# Patient Record
Sex: MALE | Race: WHITE | NOT HISPANIC OR LATINO | Employment: UNEMPLOYED | URBAN - METROPOLITAN AREA
[De-identification: names, ages, dates, MRNs, and addresses within clinical notes are randomized per-mention and may not be internally consistent; named-entity substitution may affect disease eponyms.]

---

## 2017-12-28 ENCOUNTER — HOSPITAL ENCOUNTER (EMERGENCY)
Facility: HOSPITAL | Age: 52
Discharge: HOME | End: 2017-12-28
Attending: EMERGENCY MEDICINE

## 2017-12-28 VITALS
TEMPERATURE: 99 F | RESPIRATION RATE: 18 BRPM | WEIGHT: 210 LBS | HEART RATE: 67 BPM | SYSTOLIC BLOOD PRESSURE: 124 MMHG | BODY MASS INDEX: 26.95 KG/M2 | OXYGEN SATURATION: 96 % | DIASTOLIC BLOOD PRESSURE: 63 MMHG | HEIGHT: 74 IN

## 2017-12-28 DIAGNOSIS — S16.1XXA STRAIN OF NECK MUSCLE, INITIAL ENCOUNTER: Primary | ICD-10-CM

## 2017-12-28 DIAGNOSIS — M54.9 ACUTE BILATERAL BACK PAIN, UNSPECIFIED BACK LOCATION: ICD-10-CM

## 2017-12-28 PROCEDURE — 63600175 PHARM REV CODE 636 W HCPCS: Performed by: NURSE PRACTITIONER

## 2017-12-28 PROCEDURE — 96372 THER/PROPH/DIAG INJ SC/IM: CPT

## 2017-12-28 PROCEDURE — 99283 EMERGENCY DEPT VISIT LOW MDM: CPT | Mod: 25

## 2017-12-28 RX ORDER — ORPHENADRINE CITRATE 30 MG/ML
60 INJECTION INTRAMUSCULAR; INTRAVENOUS
Status: COMPLETED | OUTPATIENT
Start: 2017-12-28 | End: 2017-12-28

## 2017-12-28 RX ORDER — METHOCARBAMOL 750 MG/1
1500 TABLET, FILM COATED ORAL 3 TIMES DAILY PRN
Qty: 30 TABLET | Refills: 0 | Status: SHIPPED | OUTPATIENT
Start: 2017-12-28 | End: 2018-01-02

## 2017-12-28 RX ORDER — KETOROLAC TROMETHAMINE 30 MG/ML
60 INJECTION, SOLUTION INTRAMUSCULAR; INTRAVENOUS
Status: COMPLETED | OUTPATIENT
Start: 2017-12-28 | End: 2017-12-28

## 2017-12-28 RX ADMIN — KETOROLAC TROMETHAMINE 60 MG: 30 INJECTION, SOLUTION INTRAMUSCULAR at 04:12

## 2017-12-28 RX ADMIN — ORPHENADRINE CITRATE 60 MG: 30 INJECTION INTRAMUSCULAR; INTRAVENOUS at 04:12
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
## 2019-10-09 ENCOUNTER — HOSPITAL ENCOUNTER (OUTPATIENT)
Facility: HOSPITAL | Age: 54
Discharge: HOME | End: 2019-10-12
Attending: EMERGENCY MEDICINE | Admitting: INTERNAL MEDICINE
Payer: MEDICAID

## 2019-10-09 DIAGNOSIS — M70.22 OLECRANON BURSITIS OF LEFT ELBOW: Primary | ICD-10-CM

## 2019-10-09 PROBLEM — I10 ESSENTIAL HYPERTENSION: Status: ACTIVE | Noted: 2019-10-09

## 2019-10-09 PROBLEM — F15.10 METHAMPHETAMINE USE: Status: ACTIVE | Noted: 2019-10-09

## 2019-10-09 PROBLEM — F33.9 MAJOR DEPRESSIVE DISORDER, RECURRENT: Status: ACTIVE | Noted: 2019-10-09

## 2019-10-09 PROBLEM — F17.200 TOBACCO DEPENDENCE: Status: ACTIVE | Noted: 2019-10-09

## 2019-10-09 PROBLEM — K21.9 GERD (GASTROESOPHAGEAL REFLUX DISEASE): Status: ACTIVE | Noted: 2019-10-09

## 2019-10-09 LAB
ALBUMIN SERPL BCP-MCNC: 4.2 G/DL (ref 3.5–5.2)
ALP SERPL-CCNC: 80 U/L (ref 55–135)
ALT SERPL W/O P-5'-P-CCNC: 15 U/L (ref 10–44)
ANION GAP SERPL CALC-SCNC: 10 MMOL/L (ref 8–16)
AST SERPL-CCNC: 16 U/L (ref 10–40)
BASOPHILS # BLD AUTO: 0.04 K/UL (ref 0–0.2)
BASOPHILS NFR BLD: 0.4 % (ref 0–1.9)
BILIRUB SERPL-MCNC: 0.4 MG/DL (ref 0.1–1)
BUN SERPL-MCNC: 12 MG/DL (ref 6–20)
CALCIUM SERPL-MCNC: 9.6 MG/DL (ref 8.7–10.5)
CHLORIDE SERPL-SCNC: 105 MMOL/L (ref 95–110)
CO2 SERPL-SCNC: 23 MMOL/L (ref 23–29)
CREAT SERPL-MCNC: 0.7 MG/DL (ref 0.5–1.4)
DIFFERENTIAL METHOD: ABNORMAL
EOSINOPHIL # BLD AUTO: 0.2 K/UL (ref 0–0.5)
EOSINOPHIL NFR BLD: 1.4 % (ref 0–8)
ERYTHROCYTE [DISTWIDTH] IN BLOOD BY AUTOMATED COUNT: 13.2 % (ref 11.5–14.5)
EST. GFR  (AFRICAN AMERICAN): >60 ML/MIN/1.73 M^2
EST. GFR  (NON AFRICAN AMERICAN): >60 ML/MIN/1.73 M^2
GLUCOSE SERPL-MCNC: 94 MG/DL (ref 70–110)
HCT VFR BLD AUTO: 44.6 % (ref 40–54)
HGB BLD-MCNC: 14.8 G/DL (ref 14–18)
IMM GRANULOCYTES # BLD AUTO: 0.03 K/UL (ref 0–0.04)
LACTATE SERPL-SCNC: 1 MMOL/L (ref 0.5–2.2)
LYMPHOCYTES # BLD AUTO: 2.2 K/UL (ref 1–4.8)
LYMPHOCYTES NFR BLD: 20 % (ref 18–48)
MCH RBC QN AUTO: 30.9 PG (ref 27–31)
MCHC RBC AUTO-ENTMCNC: 33.2 G/DL (ref 32–36)
MCV RBC AUTO: 93 FL (ref 82–98)
MONOCYTES # BLD AUTO: 0.8 K/UL (ref 0.3–1)
MONOCYTES NFR BLD: 7.4 % (ref 4–15)
NEUTROPHILS # BLD AUTO: 7.8 K/UL (ref 1.8–7.7)
NEUTROPHILS NFR BLD: 70.5 % (ref 38–73)
NRBC BLD-RTO: 0 /100 WBC
PLATELET # BLD AUTO: 279 K/UL (ref 150–350)
PMV BLD AUTO: 10.9 FL (ref 9.2–12.9)
POTASSIUM SERPL-SCNC: 4 MMOL/L (ref 3.5–5.1)
PROT SERPL-MCNC: 7.3 G/DL (ref 6–8.4)
RBC # BLD AUTO: 4.79 M/UL (ref 4.6–6.2)
SODIUM SERPL-SCNC: 138 MMOL/L (ref 136–145)
WBC # BLD AUTO: 11.02 K/UL (ref 3.9–12.7)

## 2019-10-09 PROCEDURE — 87040 BLOOD CULTURE FOR BACTERIA: CPT

## 2019-10-09 PROCEDURE — 36415 COLL VENOUS BLD VENIPUNCTURE: CPT

## 2019-10-09 PROCEDURE — 25000003 PHARM REV CODE 250: Performed by: NURSE PRACTITIONER

## 2019-10-09 PROCEDURE — 99285 EMERGENCY DEPT VISIT HI MDM: CPT | Mod: 25

## 2019-10-09 PROCEDURE — 96374 THER/PROPH/DIAG INJ IV PUSH: CPT

## 2019-10-09 PROCEDURE — S0077 INJECTION, CLINDAMYCIN PHOSP: HCPCS | Performed by: EMERGENCY MEDICINE

## 2019-10-09 PROCEDURE — G0378 HOSPITAL OBSERVATION PER HR: HCPCS

## 2019-10-09 PROCEDURE — 80053 COMPREHEN METABOLIC PANEL: CPT

## 2019-10-09 PROCEDURE — 83605 ASSAY OF LACTIC ACID: CPT

## 2019-10-09 PROCEDURE — 85025 COMPLETE CBC W/AUTO DIFF WBC: CPT

## 2019-10-09 PROCEDURE — 25000003 PHARM REV CODE 250: Performed by: EMERGENCY MEDICINE

## 2019-10-09 PROCEDURE — S4991 NICOTINE PATCH NONLEGEND: HCPCS | Performed by: EMERGENCY MEDICINE

## 2019-10-09 RX ORDER — AMLODIPINE BESYLATE 2.5 MG/1
2.5 TABLET ORAL DAILY
Status: ON HOLD | COMMUNITY
End: 2019-10-12 | Stop reason: SDUPTHER

## 2019-10-09 RX ORDER — IBUPROFEN 200 MG
24 TABLET ORAL
Status: DISCONTINUED | OUTPATIENT
Start: 2019-10-09 | End: 2019-10-12 | Stop reason: HOSPADM

## 2019-10-09 RX ORDER — POTASSIUM CHLORIDE 20 MEQ/15ML
40 SOLUTION ORAL
Status: DISCONTINUED | OUTPATIENT
Start: 2019-10-09 | End: 2019-10-12 | Stop reason: HOSPADM

## 2019-10-09 RX ORDER — ACETAMINOPHEN 325 MG/1
650 TABLET ORAL EVERY 4 HOURS PRN
Status: DISCONTINUED | OUTPATIENT
Start: 2019-10-09 | End: 2019-10-12 | Stop reason: HOSPADM

## 2019-10-09 RX ORDER — PANTOPRAZOLE SODIUM 40 MG/1
40 TABLET, DELAYED RELEASE ORAL DAILY
Status: DISCONTINUED | OUTPATIENT
Start: 2019-10-10 | End: 2019-10-12 | Stop reason: HOSPADM

## 2019-10-09 RX ORDER — ONDANSETRON 2 MG/ML
4 INJECTION INTRAMUSCULAR; INTRAVENOUS EVERY 6 HOURS PRN
Status: DISCONTINUED | OUTPATIENT
Start: 2019-10-09 | End: 2019-10-12 | Stop reason: HOSPADM

## 2019-10-09 RX ORDER — CITALOPRAM 40 MG/1
40 TABLET, FILM COATED ORAL DAILY
Status: ON HOLD | COMMUNITY
End: 2019-10-12 | Stop reason: SDUPTHER

## 2019-10-09 RX ORDER — GABAPENTIN 800 MG/1
800 TABLET ORAL DAILY
Status: ON HOLD | COMMUNITY
End: 2019-10-12 | Stop reason: SDUPTHER

## 2019-10-09 RX ORDER — LISINOPRIL 40 MG/1
40 TABLET ORAL DAILY
Status: DISCONTINUED | OUTPATIENT
Start: 2019-10-10 | End: 2019-10-12 | Stop reason: HOSPADM

## 2019-10-09 RX ORDER — GLUCAGON 1 MG
1 KIT INJECTION
Status: DISCONTINUED | OUTPATIENT
Start: 2019-10-09 | End: 2019-10-12 | Stop reason: HOSPADM

## 2019-10-09 RX ORDER — LANOLIN ALCOHOL/MO/W.PET/CERES
800 CREAM (GRAM) TOPICAL
Status: DISCONTINUED | OUTPATIENT
Start: 2019-10-09 | End: 2019-10-12 | Stop reason: HOSPADM

## 2019-10-09 RX ORDER — IBUPROFEN 200 MG
16 TABLET ORAL
Status: DISCONTINUED | OUTPATIENT
Start: 2019-10-09 | End: 2019-10-12 | Stop reason: HOSPADM

## 2019-10-09 RX ORDER — CLINDAMYCIN PHOSPHATE 900 MG/50ML
900 INJECTION, SOLUTION INTRAVENOUS
Status: COMPLETED | OUTPATIENT
Start: 2019-10-09 | End: 2019-10-09

## 2019-10-09 RX ORDER — SODIUM CHLORIDE 0.9 % (FLUSH) 0.9 %
10 SYRINGE (ML) INJECTION
Status: DISCONTINUED | OUTPATIENT
Start: 2019-10-09 | End: 2019-10-12 | Stop reason: HOSPADM

## 2019-10-09 RX ORDER — LANSOPRAZOLE 30 MG/1
30 CAPSULE, DELAYED RELEASE ORAL DAILY
COMMUNITY
End: 2019-10-09 | Stop reason: CLARIF

## 2019-10-09 RX ORDER — CITALOPRAM 40 MG/1
40 TABLET, FILM COATED ORAL DAILY
Status: DISCONTINUED | OUTPATIENT
Start: 2019-10-10 | End: 2019-10-12 | Stop reason: HOSPADM

## 2019-10-09 RX ORDER — RAMELTEON 8 MG/1
8 TABLET ORAL NIGHTLY PRN
Status: DISCONTINUED | OUTPATIENT
Start: 2019-10-09 | End: 2019-10-12 | Stop reason: HOSPADM

## 2019-10-09 RX ORDER — IBUPROFEN 200 MG
1 TABLET ORAL
Status: COMPLETED | OUTPATIENT
Start: 2019-10-09 | End: 2019-10-10

## 2019-10-09 RX ORDER — OMEPRAZOLE 20 MG/1
20 CAPSULE, DELAYED RELEASE ORAL DAILY
Status: ON HOLD | COMMUNITY
End: 2019-10-12 | Stop reason: SDUPTHER

## 2019-10-09 RX ORDER — ZOLPIDEM TARTRATE 5 MG/1
10 TABLET ORAL NIGHTLY PRN
COMMUNITY
End: 2019-10-09 | Stop reason: CLARIF

## 2019-10-09 RX ORDER — SODIUM CHLORIDE 9 MG/ML
INJECTION, SOLUTION INTRAVENOUS CONTINUOUS
Status: DISCONTINUED | OUTPATIENT
Start: 2019-10-10 | End: 2019-10-12 | Stop reason: HOSPADM

## 2019-10-09 RX ORDER — AMLODIPINE BESYLATE 2.5 MG/1
2.5 TABLET ORAL DAILY
Status: DISCONTINUED | OUTPATIENT
Start: 2019-10-10 | End: 2019-10-12 | Stop reason: HOSPADM

## 2019-10-09 RX ORDER — GABAPENTIN 400 MG/1
800 CAPSULE ORAL 3 TIMES DAILY
Status: DISCONTINUED | OUTPATIENT
Start: 2019-10-09 | End: 2019-10-12 | Stop reason: HOSPADM

## 2019-10-09 RX ORDER — POTASSIUM CHLORIDE 20 MEQ/15ML
60 SOLUTION ORAL
Status: DISCONTINUED | OUTPATIENT
Start: 2019-10-09 | End: 2019-10-12 | Stop reason: HOSPADM

## 2019-10-09 RX ORDER — CLINDAMYCIN PHOSPHATE 900 MG/50ML
900 INJECTION, SOLUTION INTRAVENOUS
Status: DISCONTINUED | OUTPATIENT
Start: 2019-10-10 | End: 2019-10-12 | Stop reason: HOSPADM

## 2019-10-09 RX ADMIN — NICOTINE 1 PATCH: 21 PATCH TRANSDERMAL at 07:10

## 2019-10-09 RX ADMIN — GABAPENTIN 800 MG: 400 CAPSULE ORAL at 10:10

## 2019-10-09 RX ADMIN — CLINDAMYCIN IN 5 PERCENT DEXTROSE 900 MG: 18 INJECTION, SOLUTION INTRAVENOUS at 07:10

## 2019-10-09 RX ADMIN — SODIUM CHLORIDE: 0.9 INJECTION, SOLUTION INTRAVENOUS at 10:10

## 2019-10-10 ENCOUNTER — ANESTHESIA EVENT (OUTPATIENT)
Dept: SURGERY | Facility: HOSPITAL | Age: 54
End: 2019-10-10
Payer: MEDICAID

## 2019-10-10 ENCOUNTER — ANESTHESIA (OUTPATIENT)
Dept: SURGERY | Facility: HOSPITAL | Age: 54
End: 2019-10-10
Payer: MEDICAID

## 2019-10-10 LAB
ANION GAP SERPL CALC-SCNC: 9 MMOL/L (ref 8–16)
BASOPHILS # BLD AUTO: 0.05 K/UL (ref 0–0.2)
BASOPHILS NFR BLD: 0.5 % (ref 0–1.9)
BUN SERPL-MCNC: 13 MG/DL (ref 6–20)
CALCIUM SERPL-MCNC: 9 MG/DL (ref 8.7–10.5)
CHLORIDE SERPL-SCNC: 103 MMOL/L (ref 95–110)
CO2 SERPL-SCNC: 22 MMOL/L (ref 23–29)
CREAT SERPL-MCNC: 0.7 MG/DL (ref 0.5–1.4)
CRP SERPL-MCNC: 17.1 MG/L (ref 0–8.2)
DIFFERENTIAL METHOD: ABNORMAL
EOSINOPHIL # BLD AUTO: 0.2 K/UL (ref 0–0.5)
EOSINOPHIL NFR BLD: 1.7 % (ref 0–8)
ERYTHROCYTE [DISTWIDTH] IN BLOOD BY AUTOMATED COUNT: 13.2 % (ref 11.5–14.5)
EST. GFR  (AFRICAN AMERICAN): >60 ML/MIN/1.73 M^2
EST. GFR  (NON AFRICAN AMERICAN): >60 ML/MIN/1.73 M^2
GLUCOSE SERPL-MCNC: 103 MG/DL (ref 70–110)
HCT VFR BLD AUTO: 43.6 % (ref 40–54)
HGB BLD-MCNC: 14.2 G/DL (ref 14–18)
IMM GRANULOCYTES # BLD AUTO: 0.03 K/UL (ref 0–0.04)
LYMPHOCYTES # BLD AUTO: 2.3 K/UL (ref 1–4.8)
LYMPHOCYTES NFR BLD: 22.7 % (ref 18–48)
MAGNESIUM SERPL-MCNC: 1.9 MG/DL (ref 1.6–2.6)
MCH RBC QN AUTO: 30.7 PG (ref 27–31)
MCHC RBC AUTO-ENTMCNC: 32.6 G/DL (ref 32–36)
MCV RBC AUTO: 94 FL (ref 82–98)
MONOCYTES # BLD AUTO: 1.2 K/UL (ref 0.3–1)
MONOCYTES NFR BLD: 11.1 % (ref 4–15)
NEUTROPHILS # BLD AUTO: 6.6 K/UL (ref 1.8–7.7)
NEUTROPHILS NFR BLD: 63.7 % (ref 38–73)
NRBC BLD-RTO: 0 /100 WBC
PLATELET # BLD AUTO: 252 K/UL (ref 150–350)
PMV BLD AUTO: 11.3 FL (ref 9.2–12.9)
POTASSIUM SERPL-SCNC: 3.9 MMOL/L (ref 3.5–5.1)
RBC # BLD AUTO: 4.62 M/UL (ref 4.6–6.2)
SODIUM SERPL-SCNC: 134 MMOL/L (ref 136–145)
WBC # BLD AUTO: 10.33 K/UL (ref 3.9–12.7)

## 2019-10-10 PROCEDURE — 96375 TX/PRO/DX INJ NEW DRUG ADDON: CPT | Mod: 59

## 2019-10-10 PROCEDURE — 87077 CULTURE AEROBIC IDENTIFY: CPT

## 2019-10-10 PROCEDURE — 86140 C-REACTIVE PROTEIN: CPT

## 2019-10-10 PROCEDURE — G0378 HOSPITAL OBSERVATION PER HR: HCPCS

## 2019-10-10 PROCEDURE — 71000039 HC RECOVERY, EACH ADD'L HOUR: Performed by: ORTHOPAEDIC SURGERY

## 2019-10-10 PROCEDURE — 94760 N-INVAS EAR/PLS OXIMETRY 1: CPT | Mod: 59

## 2019-10-10 PROCEDURE — 36415 COLL VENOUS BLD VENIPUNCTURE: CPT

## 2019-10-10 PROCEDURE — 63600175 PHARM REV CODE 636 W HCPCS: Performed by: NURSE ANESTHETIST, CERTIFIED REGISTERED

## 2019-10-10 PROCEDURE — 36000705 HC OR TIME LEV I EA ADD 15 MIN: Performed by: ORTHOPAEDIC SURGERY

## 2019-10-10 PROCEDURE — 87075 CULTR BACTERIA EXCEPT BLOOD: CPT

## 2019-10-10 PROCEDURE — 87186 SC STD MICRODIL/AGAR DIL: CPT

## 2019-10-10 PROCEDURE — 63600175 PHARM REV CODE 636 W HCPCS: Performed by: NURSE PRACTITIONER

## 2019-10-10 PROCEDURE — 00400 ANES INTEGUMENTARY SYS NOS: CPT | Performed by: ORTHOPAEDIC SURGERY

## 2019-10-10 PROCEDURE — 87070 CULTURE OTHR SPECIMN AEROBIC: CPT

## 2019-10-10 PROCEDURE — 99900104 DSU ONLY-NO CHARGE-EA ADD'L HR (STAT): Performed by: ORTHOPAEDIC SURGERY

## 2019-10-10 PROCEDURE — 24105 PR REMOVAL OF ELBOW BURSA: ICD-10-PCS | Mod: LT,,, | Performed by: ORTHOPAEDIC SURGERY

## 2019-10-10 PROCEDURE — 87206 SMEAR FLUORESCENT/ACID STAI: CPT

## 2019-10-10 PROCEDURE — 99202 OFFICE O/P NEW SF 15 MIN: CPT | Mod: 57,,, | Performed by: ORTHOPAEDIC SURGERY

## 2019-10-10 PROCEDURE — D9220A PRA ANESTHESIA: ICD-10-PCS | Mod: ,,, | Performed by: ANESTHESIOLOGY

## 2019-10-10 PROCEDURE — 25000003 PHARM REV CODE 250: Performed by: ANESTHESIOLOGY

## 2019-10-10 PROCEDURE — 71000033 HC RECOVERY, INTIAL HOUR: Performed by: ORTHOPAEDIC SURGERY

## 2019-10-10 PROCEDURE — D9220A PRA ANESTHESIA: Mod: ,,, | Performed by: ANESTHESIOLOGY

## 2019-10-10 PROCEDURE — 24105 EXCISION OLECRANON BURSA: CPT | Mod: LT,,, | Performed by: ORTHOPAEDIC SURGERY

## 2019-10-10 PROCEDURE — 87116 MYCOBACTERIA CULTURE: CPT

## 2019-10-10 PROCEDURE — 85025 COMPLETE CBC W/AUTO DIFF WBC: CPT

## 2019-10-10 PROCEDURE — S0077 INJECTION, CLINDAMYCIN PHOSP: HCPCS | Performed by: NURSE PRACTITIONER

## 2019-10-10 PROCEDURE — 37000009 HC ANESTHESIA EA ADD 15 MINS: Mod: 59 | Performed by: ORTHOPAEDIC SURGERY

## 2019-10-10 PROCEDURE — 25000003 PHARM REV CODE 250: Performed by: NURSE PRACTITIONER

## 2019-10-10 PROCEDURE — 37000008 HC ANESTHESIA 1ST 15 MINUTES: Mod: 59 | Performed by: ORTHOPAEDIC SURGERY

## 2019-10-10 PROCEDURE — 25000003 PHARM REV CODE 250: Performed by: NURSE ANESTHETIST, CERTIFIED REGISTERED

## 2019-10-10 PROCEDURE — 87102 FUNGUS ISOLATION CULTURE: CPT

## 2019-10-10 PROCEDURE — 36000704 HC OR TIME LEV I 1ST 15 MIN: Performed by: ORTHOPAEDIC SURGERY

## 2019-10-10 PROCEDURE — 27200651 HC AIRWAY, LMA: Performed by: NURSE ANESTHETIST, CERTIFIED REGISTERED

## 2019-10-10 PROCEDURE — 99900103 DSU ONLY-NO CHARGE-INITIAL HR (STAT): Performed by: ORTHOPAEDIC SURGERY

## 2019-10-10 PROCEDURE — 99202 PR OFFICE/OUTPT VISIT, NEW, LEVL II, 15-29 MIN: ICD-10-PCS | Mod: 57,,, | Performed by: ORTHOPAEDIC SURGERY

## 2019-10-10 PROCEDURE — 96376 TX/PRO/DX INJ SAME DRUG ADON: CPT | Mod: 59

## 2019-10-10 PROCEDURE — 80048 BASIC METABOLIC PNL TOTAL CA: CPT

## 2019-10-10 PROCEDURE — 87015 SPECIMEN INFECT AGNT CONCNTJ: CPT

## 2019-10-10 PROCEDURE — 83735 ASSAY OF MAGNESIUM: CPT

## 2019-10-10 RX ORDER — HYDROCODONE BITARTRATE AND ACETAMINOPHEN 10; 325 MG/1; MG/1
1 TABLET ORAL EVERY 4 HOURS PRN
Status: DISCONTINUED | OUTPATIENT
Start: 2019-10-10 | End: 2019-10-12 | Stop reason: HOSPADM

## 2019-10-10 RX ORDER — ONDANSETRON HYDROCHLORIDE 2 MG/ML
INJECTION, SOLUTION INTRAMUSCULAR; INTRAVENOUS
Status: DISCONTINUED | OUTPATIENT
Start: 2019-10-10 | End: 2019-10-10

## 2019-10-10 RX ORDER — SODIUM CHLORIDE, SODIUM LACTATE, POTASSIUM CHLORIDE, CALCIUM CHLORIDE 600; 310; 30; 20 MG/100ML; MG/100ML; MG/100ML; MG/100ML
75 INJECTION, SOLUTION INTRAVENOUS CONTINUOUS
Status: DISCONTINUED | OUTPATIENT
Start: 2019-10-10 | End: 2019-10-12 | Stop reason: HOSPADM

## 2019-10-10 RX ORDER — MIDAZOLAM HYDROCHLORIDE 1 MG/ML
INJECTION, SOLUTION INTRAMUSCULAR; INTRAVENOUS
Status: DISCONTINUED | OUTPATIENT
Start: 2019-10-10 | End: 2019-10-10

## 2019-10-10 RX ORDER — SODIUM CHLORIDE, SODIUM LACTATE, POTASSIUM CHLORIDE, CALCIUM CHLORIDE 600; 310; 30; 20 MG/100ML; MG/100ML; MG/100ML; MG/100ML
INJECTION, SOLUTION INTRAVENOUS CONTINUOUS PRN
Status: DISCONTINUED | OUTPATIENT
Start: 2019-10-10 | End: 2019-10-10

## 2019-10-10 RX ORDER — HYDROMORPHONE HYDROCHLORIDE 2 MG/ML
0.2 INJECTION, SOLUTION INTRAMUSCULAR; INTRAVENOUS; SUBCUTANEOUS EVERY 5 MIN PRN
Status: DISCONTINUED | OUTPATIENT
Start: 2019-10-10 | End: 2019-10-10 | Stop reason: HOSPADM

## 2019-10-10 RX ORDER — HYDROCODONE BITARTRATE AND ACETAMINOPHEN 5; 325 MG/1; MG/1
1 TABLET ORAL EVERY 4 HOURS PRN
Status: DISCONTINUED | OUTPATIENT
Start: 2019-10-10 | End: 2019-10-12 | Stop reason: HOSPADM

## 2019-10-10 RX ORDER — IBUPROFEN 400 MG/1
800 TABLET ORAL 3 TIMES DAILY
Status: DISCONTINUED | OUTPATIENT
Start: 2019-10-10 | End: 2019-10-12 | Stop reason: HOSPADM

## 2019-10-10 RX ORDER — DIPHENHYDRAMINE HYDROCHLORIDE 50 MG/ML
25 INJECTION INTRAMUSCULAR; INTRAVENOUS EVERY 6 HOURS PRN
Status: DISCONTINUED | OUTPATIENT
Start: 2019-10-10 | End: 2019-10-10 | Stop reason: HOSPADM

## 2019-10-10 RX ORDER — ONDANSETRON 2 MG/ML
4 INJECTION INTRAMUSCULAR; INTRAVENOUS ONCE
Status: DISCONTINUED | OUTPATIENT
Start: 2019-10-10 | End: 2019-10-10 | Stop reason: HOSPADM

## 2019-10-10 RX ORDER — MEPERIDINE HYDROCHLORIDE 50 MG/ML
12.5 INJECTION INTRAMUSCULAR; INTRAVENOUS; SUBCUTANEOUS ONCE AS NEEDED
Status: DISCONTINUED | OUTPATIENT
Start: 2019-10-10 | End: 2019-10-10 | Stop reason: HOSPADM

## 2019-10-10 RX ORDER — PROPOFOL 10 MG/ML
VIAL (ML) INTRAVENOUS
Status: DISCONTINUED | OUTPATIENT
Start: 2019-10-10 | End: 2019-10-10

## 2019-10-10 RX ORDER — FENTANYL CITRATE 50 UG/ML
25 INJECTION, SOLUTION INTRAMUSCULAR; INTRAVENOUS EVERY 5 MIN PRN
Status: DISCONTINUED | OUTPATIENT
Start: 2019-10-10 | End: 2019-10-10 | Stop reason: HOSPADM

## 2019-10-10 RX ORDER — SODIUM CHLORIDE 0.9 % (FLUSH) 0.9 %
3 SYRINGE (ML) INJECTION
Status: DISCONTINUED | OUTPATIENT
Start: 2019-10-10 | End: 2019-10-10 | Stop reason: HOSPADM

## 2019-10-10 RX ORDER — DEXAMETHASONE SODIUM PHOSPHATE 4 MG/ML
INJECTION, SOLUTION INTRA-ARTICULAR; INTRALESIONAL; INTRAMUSCULAR; INTRAVENOUS; SOFT TISSUE
Status: DISCONTINUED | OUTPATIENT
Start: 2019-10-10 | End: 2019-10-10

## 2019-10-10 RX ORDER — EPHEDRINE SULFATE 50 MG/ML
INJECTION, SOLUTION INTRAVENOUS
Status: DISCONTINUED | OUTPATIENT
Start: 2019-10-10 | End: 2019-10-10

## 2019-10-10 RX ORDER — CLINDAMYCIN HYDROCHLORIDE 300 MG/1
300 CAPSULE ORAL EVERY 8 HOURS
Qty: 30 CAPSULE | Refills: 0 | Status: SHIPPED | OUTPATIENT
Start: 2019-10-10 | End: 2019-10-12 | Stop reason: HOSPADM

## 2019-10-10 RX ORDER — OXYCODONE HYDROCHLORIDE 5 MG/1
5 TABLET ORAL
Status: DISCONTINUED | OUTPATIENT
Start: 2019-10-10 | End: 2019-10-10 | Stop reason: HOSPADM

## 2019-10-10 RX ORDER — PHENYLEPHRINE HYDROCHLORIDE 10 MG/ML
INJECTION INTRAVENOUS
Status: DISCONTINUED | OUTPATIENT
Start: 2019-10-10 | End: 2019-10-10

## 2019-10-10 RX ORDER — FENTANYL CITRATE 50 UG/ML
INJECTION, SOLUTION INTRAMUSCULAR; INTRAVENOUS
Status: DISCONTINUED | OUTPATIENT
Start: 2019-10-10 | End: 2019-10-10

## 2019-10-10 RX ORDER — LIDOCAINE HCL/PF 100 MG/5ML
SYRINGE (ML) INTRAVENOUS
Status: DISCONTINUED | OUTPATIENT
Start: 2019-10-10 | End: 2019-10-10

## 2019-10-10 RX ORDER — KETOROLAC TROMETHAMINE 30 MG/ML
15 INJECTION, SOLUTION INTRAMUSCULAR; INTRAVENOUS EVERY 6 HOURS PRN
Status: DISCONTINUED | OUTPATIENT
Start: 2019-10-10 | End: 2019-10-12 | Stop reason: HOSPADM

## 2019-10-10 RX ADMIN — RAMELTEON 8 MG: 8 TABLET, FILM COATED ORAL at 03:10

## 2019-10-10 RX ADMIN — PHENYLEPHRINE HYDROCHLORIDE 100 MCG: 10 INJECTION INTRAVENOUS at 03:10

## 2019-10-10 RX ADMIN — FENTANYL CITRATE 50 MCG: 50 INJECTION, SOLUTION INTRAMUSCULAR; INTRAVENOUS at 03:10

## 2019-10-10 RX ADMIN — GABAPENTIN 800 MG: 400 CAPSULE ORAL at 08:10

## 2019-10-10 RX ADMIN — FENTANYL CITRATE 100 MCG: 50 INJECTION, SOLUTION INTRAMUSCULAR; INTRAVENOUS at 02:10

## 2019-10-10 RX ADMIN — KETOROLAC TROMETHAMINE 15 MG: 30 INJECTION, SOLUTION INTRAMUSCULAR at 05:10

## 2019-10-10 RX ADMIN — CLINDAMYCIN IN 5 PERCENT DEXTROSE 900 MG: 18 INJECTION, SOLUTION INTRAVENOUS at 04:10

## 2019-10-10 RX ADMIN — CLINDAMYCIN IN 5 PERCENT DEXTROSE 900 MG: 18 INJECTION, SOLUTION INTRAVENOUS at 08:10

## 2019-10-10 RX ADMIN — SODIUM CHLORIDE, SODIUM LACTATE, POTASSIUM CHLORIDE, AND CALCIUM CHLORIDE: .6; .31; .03; .02 INJECTION, SOLUTION INTRAVENOUS at 02:10

## 2019-10-10 RX ADMIN — DEXAMETHASONE SODIUM PHOSPHATE 4 MG: 4 INJECTION, SOLUTION INTRAMUSCULAR; INTRAVENOUS at 03:10

## 2019-10-10 RX ADMIN — LIDOCAINE HYDROCHLORIDE 75 MG: 20 INJECTION, SOLUTION INTRAVENOUS at 03:10

## 2019-10-10 RX ADMIN — EPHEDRINE SULFATE 20 MG: 50 INJECTION, SOLUTION INTRAMUSCULAR; INTRAVENOUS; SUBCUTANEOUS at 03:10

## 2019-10-10 RX ADMIN — CLINDAMYCIN IN 5 PERCENT DEXTROSE 900 MG: 18 INJECTION, SOLUTION INTRAVENOUS at 12:10

## 2019-10-10 RX ADMIN — PROPOFOL 150 MG: 10 INJECTION, EMULSION INTRAVENOUS at 03:10

## 2019-10-10 RX ADMIN — ACETAMINOPHEN 650 MG: 325 TABLET ORAL at 03:10

## 2019-10-10 RX ADMIN — ONDANSETRON 4 MG: 2 INJECTION, SOLUTION INTRAMUSCULAR; INTRAVENOUS at 03:10

## 2019-10-10 RX ADMIN — EPHEDRINE SULFATE 10 MG: 50 INJECTION, SOLUTION INTRAMUSCULAR; INTRAVENOUS; SUBCUTANEOUS at 03:10

## 2019-10-10 RX ADMIN — MIDAZOLAM 2 MG: 1 INJECTION INTRAMUSCULAR; INTRAVENOUS at 02:10

## 2019-10-10 RX ADMIN — OXYCODONE HYDROCHLORIDE 5 MG: 5 TABLET ORAL at 04:10

## 2019-10-10 NOTE — PLAN OF CARE
10/10/19 1114   Tobacco Cessation Intervention   Do you use any type of tobacco product? Yes   Are you interested in quitting use of tobacco products? Thinking about quitting   Pt is a [place] resident and is a current daily smoker and shows interest in quitting.  Pt was educated on smoking cessation and was given information on the [place] Quit Now program.

## 2019-10-10 NOTE — CONSULTS
Past Medical History:   Diagnosis Date    COPD (chronic obstructive pulmonary disease)     Depression     GERD (gastroesophageal reflux disease)     Hepatitis C     Hypertension        Past Surgical History:   Procedure Laterality Date    MULTIPLE TOOTH EXTRACTIONS      TONSILLECTOMY         Current Facility-Administered Medications   Medication    0.9%  NaCl infusion    acetaminophen tablet 650 mg    amLODIPine tablet 2.5 mg    citalopram tablet 40 mg    clindamycin 900 MG/50 ML D5W 900 mg/50 mL IVPB 900 mg    dextrose 50% injection 12.5 g    dextrose 50% injection 25 g    gabapentin capsule 800 mg    glucagon (human recombinant) injection 1 mg    glucose chewable tablet 16 g    glucose chewable tablet 24 g    ketorolac injection 15 mg    lisinopril tablet 40 mg    magnesium oxide tablet 800 mg    magnesium oxide tablet 800 mg    nicotine 21 mg/24 hr 1 patch    ondansetron injection 4 mg    pantoprazole EC tablet 40 mg    potassium chloride 10% oral solution 40 mEq    potassium chloride 10% oral solution 40 mEq    potassium chloride 10% oral solution 60 mEq    ramelteon tablet 8 mg    sodium chloride 0.9% flush 10 mL       Review of patient's allergies indicates:   Allergen Reactions    Pcn [penicillins] Hives       Family History   Problem Relation Age of Onset    Stroke Mother     Heart disease Mother     COPD Father     Heart disease Father     Mental illness Brother        Social History     Socioeconomic History    Marital status: Single     Spouse name: Not on file    Number of children: Not on file    Years of education: Not on file    Highest education level: Not on file   Occupational History    Not on file   Social Needs    Financial resource strain: Not on file    Food insecurity:     Worry: Not on file     Inability: Not on file    Transportation needs:     Medical: Not on file     Non-medical: Not on file   Tobacco Use    Smoking status: Current Every Day  Smoker     Packs/day: 1.00     Years: 30.00     Pack years: 30.00     Types: Cigarettes   Substance and Sexual Activity    Alcohol use: Yes     Comment: occasionally    Drug use: Yes     Frequency: 2.0 times per week     Types: Methamphetamines     Comment: Reports use twice weekly, smokes crystal meth    Sexual activity: Yes     Partners: Female   Lifestyle    Physical activity:     Days per week: Not on file     Minutes per session: Not on file    Stress: Not on file   Relationships    Social connections:     Talks on phone: Not on file     Gets together: Not on file     Attends Anabaptist service: Not on file     Active member of club or organization: Not on file     Attends meetings of clubs or organizations: Not on file     Relationship status: Not on file   Other Topics Concern    Not on file   Social History Narrative    Not on file       Chief Complaint:   Chief Complaint   Patient presents with    Abscess     x 1 week, left arm, right thigh, and left arm pit       Consulting Physician: Self, Aaareferral    History of present illness:    This is a 54 y.o. year old male who complains of left elbow pain and swelling for 2-3 weeks. Recently worse with more pain and limited ROM. Denies injury or bite that he is aware of, and denies IVDA.    Review of Systems:    Constitution: Denies chills, fever, and sweats.  HENT: Denies headaches or blurry vision.  Cardiovascular: Denies chest pain or irregular heart beat.  Respiratory: Denies cough or shortness of breath.  Gastrointestinal: Denies abdominal pain, nausea, or vomiting.  Musculoskeletal:  Denies muscle cramps.  Neurological: Denies dizziness or focal weakness.  Psychiatric/Behavioral: Normal mental status.  Hematologic/Lymphatic: Denies bleeding problem or easy bruising/bleeding.  Skin: Denies rash or suspicious lesions.    Examination:    Vital Signs:    Vitals:    10/10/19 0426 10/10/19 0737 10/10/19 1118 10/10/19 1355   BP: 105/60 100/60 109/65  113/78   Pulse: 65 (!) 56 (!) 51 63   Resp: 18 15 16 17   Temp: 97.5 °F (36.4 °C) 96.3 °F (35.7 °C) 96.9 °F (36.1 °C) 98.6 °F (37 °C)   TempSrc: Oral Oral Oral Oral   SpO2: 97% 98% 99% 95%   Weight:       Height:           Body mass index is 20.38 kg/m².    This a well-developed, well nourished patient in no acute distress.    Alert and oriented x 3 and cooperative to examination.       Physical Exam: Left Elbow Exam    Skin  Scars:   None  Rash:   None    Inspection  Erythema:  mild  Bruising:  None  Swelling:  Olecranon bursa  Masses:  2-3cm subcutaneous abcess with 1cm skin lesion  Lymphadenopathy: None    Range of Motion  Flexion:  100°  Extension:  20°  Supination:  80°  Pronation:  80°    Tenderness  Medial Epicondyle: None  Lateral Epicondyle: None  Olecranon:  yes    Stability  Valgus Stress:  Stable  Varus Stress:  Stable    Strength:  Normal  Sensation:  Intact    Vascular  Pulses:  Palpable  Capillary Refill: Normal    Normal WBC but elevated CRP.      Imaging: X-rays ordered and images interpreted today personally by me of left elbow show soft tissue swelling but otherwise normal.        Assessment: Left elbow abcess    Plan:  He has a painful abcess over the olecranon bursa. Discussed options and he would like this drained. We will debride the abcess and obtain cultures.     After discussing the diagnosis and reviewing treatment options, the patient elected to proceed with surgical intervention.    In preparation for surgery:    A pre-operative clearance request was placed with primary hospital physician.    Appropriate pre-operative labs were ordered.    Pertinent risk factors and comorbidities for surgery were identified and reviewed, including HTN.    Pre-operative antibiotics were ordered.    The risks, benefits, and alternatives to the procedure were explained to the patient/family including, but not limited to: incomplete pain relief, surgical failure, hardware failure, need for further  procedures, damage to nerves, arteries, blood vessels and other structures, infection, Deep Vein Thrombosis (DVT), Pulmonary Embolus (PE), Complex Regional Pain Syndrome as well as general anesthetic complications including seizure, stroke, heart attack and even death. The patient/family understood these risks and wished to proceed and signed the informed consent. All questions were answered. No guarantees were implied or stated.    We will obtain the necessary clearances and schedule the patient for surgery.            DISCLAIMER: This note may have been dictated using voice recognition software and may contain grammatical errors.     NOTE: Consult report sent to referring provider via [employer] EMR.

## 2019-10-10 NOTE — PLAN OF CARE
CM met with pt bedside to complete discharge assessment. Pt verified information on facesheet as correct. Pt denies POA/LW. Pt reports living at listed address with his brother, Ad (pt does not remember number). PCP is [facility]- does not remember which doctor. [facility] [address] Pt denies hh/hd/dme. Pt reports being independent with all ADLs. Pt does not have a car, but uses his bike for transportation. Pt states that a family member will bring pt home once dc. DC plan is home. CM following to assist.        10/10/19 1230   Discharge Assessment   Assessment Type Discharge Planning Assessment   Confirmed/corrected address and phone number on facesheet? Yes   Assessment information obtained from? Patient   Communicated expected length of stay with patient/caregiver yes   Prior to hospitilization cognitive status: Alert/Oriented   Prior to hospitalization functional status: Independent   Current cognitive status: Alert/Oriented   Current Functional Status: Independent   Lives With sibling(s)   Able to Return to Prior Arrangements yes   Is patient able to care for self after discharge? Yes   Patient's perception of discharge disposition home or selfcare   Patient currently being followed by outpatient case management? No   Patient currently receives any other outside agency services? No   Equipment Currently Used at Home none   Do you have any problems affording any of your prescribed medications? No   Is the patient taking medications as prescribed? yes   Does the patient have transportation home? Yes   Transportation Anticipated family or friend will provide   Does the patient receive services at the [facility]? No   Discharge Plan A Home   DME Needed Upon Discharge  none   Patient/Family in Agreement with Plan yes

## 2019-10-10 NOTE — NURSING
Received report from Zen Figueroa Patient appears asleep in bed. Chest rise and fall. Will continue to monitor.

## 2019-10-10 NOTE — TRANSFER OF CARE
"Anesthesia Transfer of Care Note    Patient: Mook Anaya    Procedure(s) Performed: Procedure(s) (LRB):  INCISION AND DRAINAGE, ABSCESS (Left)    Patient location: PACU    Anesthesia Type: general    Transport from OR: Transported from OR on 2-3 L/min O2 by NC with adequate spontaneous ventilation    Post pain: adequate analgesia    Post assessment: no apparent anesthetic complications and tolerated procedure well    Post vital signs: stable    Level of consciousness: awake, alert and oriented    Nausea/Vomiting: no nausea/vomiting    Complications: none    Transfer of care protocol was followed      Last vitals:   Visit Vitals  /78 (BP Location: Right arm, Patient Position: Lying)   Pulse 63   Temp 37 °C (98.6 °F) (Oral)   Resp 17   Ht 6' 2" (1.88 m)   Wt 72 kg (158 lb 11.7 oz)   SpO2 95%   BMI 20.38 kg/m²     "

## 2019-10-10 NOTE — ASSESSMENT & PLAN NOTE
Patient reports smoking crystal meth yesterday and a regular use of 1-2 times per week, denies IV drug use. Will monitor for s/s of withdrawal. IVF hydration. Continuous telemetry monitoring.

## 2019-10-10 NOTE — SUBJECTIVE & OBJECTIVE
Interval History:  No new issues overnight.  Patient with persistent left elbow pain and swelling which improves with pain medication.  He is NPO awaiting ortho evaluation.    Review of Systems   Constitutional: Positive for activity change and fatigue. Negative for appetite change, chills and diaphoresis.   HENT: Negative for congestion, hearing loss and sore throat.    Respiratory: Negative for cough, shortness of breath and wheezing.    Cardiovascular: Negative for chest pain and palpitations.   Gastrointestinal: Negative for abdominal pain and nausea.   Genitourinary: Negative for dysuria, flank pain and hematuria.   Musculoskeletal: Positive for joint swelling. Negative for arthralgias, back pain and myalgias (Left elbow).   Skin: Positive for color change.        Redness and swelling to left elbow   Neurological: Negative for dizziness, syncope and light-headedness.   Hematological: Negative for adenopathy.   Psychiatric/Behavioral: Negative for agitation and confusion. The patient is not nervous/anxious.      Objective:     Vital Signs (Most Recent):  Temp: 98.1 °F (36.7 °C) (10/10/19 1603)  Pulse: 62 (10/10/19 1655)  Resp: (!) 29 (10/10/19 1655)  BP: (!) 143/76 (10/10/19 1655)  SpO2: 99 % (10/10/19 1655) Vital Signs (24h Range):  Temp:  [96.3 °F (35.7 °C)-98.6 °F (37 °C)] 98.1 °F (36.7 °C)  Pulse:  [48-81] 62  Resp:  [12-29] 29  SpO2:  [95 %-100 %] 99 %  BP: (100-147)/(58-87) 143/76     Weight: 72 kg (158 lb 11.7 oz)  Body mass index is 20.38 kg/m².    Intake/Output Summary (Last 24 hours) at 10/10/2019 1659  Last data filed at 10/10/2019 1605  Gross per 24 hour   Intake 800 ml   Output 530 ml   Net 270 ml      Physical Exam   Constitutional: He is oriented to person, place, and time. He appears well-developed and well-nourished.   Thin male   HENT:   Head: Normocephalic and atraumatic.   Nose: Nose normal.   Mouth/Throat: Oropharynx is clear and moist.   Eyes: Pupils are equal, round, and reactive to light.  Conjunctivae and EOM are normal.   Neck: Normal range of motion. Neck supple. JVD present.   Cardiovascular: Normal rate, regular rhythm, normal heart sounds and intact distal pulses.   No murmur heard.  Pulmonary/Chest: Effort normal and breath sounds normal. He has no wheezes.   Abdominal: Soft. Bowel sounds are normal. There is no guarding.   Musculoskeletal: Normal range of motion. He exhibits edema and tenderness.   Left elbow erythema, swelling and tenderness with palpation pustular noted to elbow.   Neurological: He is alert and oriented to person, place, and time. Coordination normal.   Skin: Skin is warm and dry. There is erythema.   Psychiatric: He has a normal mood and affect. His behavior is normal.   Nursing note and vitals reviewed.      Significant Labs:   CBC:   Recent Labs   Lab 10/09/19  1915 10/10/19  0424   WBC 11.02 10.33   HGB 14.8 14.2   HCT 44.6 43.6    252     CMP:   Recent Labs   Lab 10/09/19  1915 10/10/19  0424    134*   K 4.0 3.9    103   CO2 23 22*   GLU 94 103   BUN 12 13   CREATININE 0.7 0.7   CALCIUM 9.6 9.0   PROT 7.3  --    ALBUMIN 4.2  --    BILITOT 0.4  --    ALKPHOS 80  --    AST 16  --    ALT 15  --    ANIONGAP 10 9   EGFRNONAA >60 >60       Significant Imaging: I have reviewed and interpreted all pertinent imaging results/findings within the past 24 hours.

## 2019-10-10 NOTE — BRIEF OP NOTE
[facility]  Brief Operative Note    SUMMARY     Surgery Date: 10/10/2019     Surgeon(s) and Role:     * Louis Donahue MD - Primary    Assisting Surgeon: None    Pre-op Diagnosis:  Abscess [L02.91]    Post-op Diagnosis:  Post-Op Diagnosis Codes:     * Abscess [L02.91]    Procedure(s) (LRB):  INCISION AND DRAINAGE, ABSCESS (Left)    Anesthesia: General    Description of Procedure: left elbow olecranon bursectomy    Description of the findings of the procedure: See Dictation     Estimated Blood Loss: 30 mL         Specimens:   Specimen (12h ago, onward)    None        Dictation #1  MRN:[id number]  [facility]:[id number]  [id number]

## 2019-10-10 NOTE — ASSESSMENT & PLAN NOTE
Chronic, controlled. Continue home medications. Will treat with PRN antihypertensives to keep SBP <180.     BP Readings from Last 3 Encounters:   10/09/19 119/70   12/28/17 124/63   03/02/14 (!) 139/94

## 2019-10-10 NOTE — ASSESSMENT & PLAN NOTE
Does not meet sepsis criteria at time of admission - Blood cultures pending, WBC 11, Lactic 1.0, afebrile. Orthopedic consult placed. IV Clindamycin. NPO at midnight. L elbow xray pending.

## 2019-10-10 NOTE — PLAN OF CARE
Left elbow I&D with ace wrap in place with no drain and arm sling. Admin pain med for relief. NAD noted. VSS. Okay to transfer to floor per Anesthesia

## 2019-10-10 NOTE — ASSESSMENT & PLAN NOTE
Does not meet sepsis criteria at time of admission - Blood cultures pending, WBC 11, Lactic 1.0, afebrile. Orthopedic consult placed. IV Clindamycin. NPO at midnight. L elbow xray reviewed with no abscess noted  Patient likely with cellulitis and abscess.  Discussed with Dr. Donahue and patient will go to the OR this afternoon.  Continue IV antibiotic

## 2019-10-10 NOTE — HPI
Mook Anaya is a 54-year-old male with a past medical history of depression, hypertension, arthritis, hepatitis-C, and GERD who presented to the emergency room tonight with reports of left elbow pain and swelling. Patient reports abscess to left elbow that appeared initially 2 weeks ago, and has increased in size and tenderness. Patient also reports abscess to the axilla region and thigh.  Patient reports pain to the elbow is 10 on a 0-10 pain scale, has not tried over-the-counter medications for pain relief, denies drainage, describes pain as stabbing, pressure, and burning.  Patient denies fever, but does endorse nausea and diarrhea for the past 3 days, no vomiting.  Patient reports history of staph infection, last treated at  approximately 2 years ago.  Patient reports significant tobacco dependency and appreciates the use of crystal meth, last consumed yesterday, patient states he smokes crystal meth approximately 1-2 times per week.  Patient denies intravenous drug use.  Patient appreciates weight loss, stating he does not have electricity at his home or access to groceries.  The patient placed in the hospital overnight for further workup and management of symptoms.  Patient not accompanied by any visitors during the initial interview.

## 2019-10-10 NOTE — NURSING
Returned from surgery via stretcher, alert, oriented, v/s wnl. Fall prevention ongoing left arm with ace wrap and sling in use, dressing dry and intact

## 2019-10-10 NOTE — SUBJECTIVE & OBJECTIVE
Past Medical History:   Diagnosis Date    COPD (chronic obstructive pulmonary disease)     Depression     GERD (gastroesophageal reflux disease)     Hepatitis C     Hypertension        Past Surgical History:   Procedure Laterality Date    MULTIPLE TOOTH EXTRACTIONS      TONSILLECTOMY         Review of patient's allergies indicates:   Allergen Reactions    Pcn [penicillins] Hives       No current facility-administered medications on file prior to encounter.      Current Outpatient Medications on File Prior to Encounter   Medication Sig    amLODIPine (NORVASC) 2.5 MG tablet Take 2.5 mg by mouth once daily.    gabapentin (NEURONTIN) 800 MG tablet Take 800 mg by mouth once daily.     lisinopril (PRINIVIL,ZESTRIL) 40 MG tablet Take 40 mg by mouth once daily.     omeprazole (PRILOSEC) 20 MG capsule Take 20 mg by mouth once daily.    citalopram (CELEXA) 40 MG tablet Take 40 mg by mouth once daily.    [DISCONTINUED] albuterol (PROVENTIL HFA) 90 mcg/actuation inhaler Inhale 2 puffs into the lungs every 6 (six) hours as needed.    [DISCONTINUED] aspirin 325 MG tablet Take 325 mg by mouth once daily.    [DISCONTINUED] diazepam (VALIUM) 5 MG tablet Take 1 tablet (5 mg total) by mouth every 8 (eight) hours as needed (muscle spasm).    [DISCONTINUED] lansoprazole (PREVACID) 30 MG capsule Take 30 mg by mouth once daily.    [DISCONTINUED] tramadol (ULTRAM) 50 mg tablet Take 1 tablet (50 mg total) by mouth every 6 (six) hours as needed for Pain.    [DISCONTINUED] zolpidem (AMBIEN) 5 MG Tab Take 10 mg by mouth nightly as needed.     Family History     Problem Relation (Age of Onset)    COPD Father    Heart disease Mother, Father    Mental illness Brother    Stroke Mother        Tobacco Use    Smoking status: Current Every Day Smoker     Packs/day: 1.00     Years: 30.00     Pack years: 30.00     Types: Cigarettes   Substance and Sexual Activity    Alcohol use: Yes     Comment: occasionally    Drug use: Yes      Frequency: 2.0 times per week     Types: Methamphetamines     Comment: Reports use twice weekly, smokes crystal meth    Sexual activity: Yes     Partners: Female     Review of Systems   Constitutional: Negative for activity change, appetite change, chills, fatigue and fever.   HENT: Negative for congestion, sinus pressure, sinus pain and sore throat.    Eyes: Negative for photophobia and visual disturbance.   Respiratory: Negative for cough, choking, chest tightness, shortness of breath and wheezing.    Cardiovascular: Negative for chest pain, palpitations and leg swelling.   Gastrointestinal: Positive for diarrhea and nausea. Negative for abdominal distention, abdominal pain, blood in stool, constipation and vomiting.   Genitourinary: Negative for difficulty urinating, dysuria, flank pain and hematuria.   Musculoskeletal: Positive for back pain and joint swelling. Negative for gait problem.        Chronic back pain  L elbow swelling, tenderness.     Skin: Positive for wound. Negative for rash.        Skin abscess to R thigh, L axilla, and L elbow.   Neurological: Negative for dizziness, syncope, weakness, light-headedness, numbness and headaches.   Psychiatric/Behavioral: Negative for confusion. The patient is not nervous/anxious.      Objective:     Vital Signs (Most Recent):  Temp: 98.2 °F (36.8 °C) (10/09/19 1845)  Pulse: 81 (10/09/19 1845)  Resp: 16 (10/09/19 1845)  BP: 123/77 (10/09/19 1845)  SpO2: 97 % (10/09/19 1845) Vital Signs (24h Range):  Temp:  [98.2 °F (36.8 °C)] 98.2 °F (36.8 °C)  Pulse:  [81] 81  Resp:  [16] 16  SpO2:  [97 %] 97 %  BP: (123)/(77) 123/77     Weight: 72.3 kg (159 lb 6.3 oz)  Body mass index is 20.46 kg/m².    Physical Exam   Constitutional: He is oriented to person, place, and time. No distress.   Unkempt   HENT:   Head: Normocephalic and atraumatic.   Poor dentition     Eyes: Pupils are equal, round, and reactive to light. EOM are normal. Right eye exhibits no discharge. Left eye  exhibits no discharge.   Neck: Normal range of motion. No JVD present.   Cardiovascular: Normal rate, regular rhythm, normal heart sounds and intact distal pulses.   No murmur heard.  Pulmonary/Chest: Effort normal and breath sounds normal. No respiratory distress. He has no wheezes. He has no rales. He exhibits no tenderness.   Abdominal: Soft. Bowel sounds are normal. He exhibits no distension. There is no tenderness. There is no rebound and no guarding.   Genitourinary:   Genitourinary Comments: Exam Deferred      Musculoskeletal: Normal range of motion. He exhibits edema and deformity. He exhibits no tenderness.   L elbow swelling   Neurological: He is alert and oriented to person, place, and time. No cranial nerve deficit or sensory deficit.   Skin: Skin is warm and dry. Capillary refill takes 2 to 3 seconds. No rash noted. He is not diaphoretic. There is erythema.   2 small abscess noted to L axilla, tender upon palpation, no drainage noted.  1 small abscess noted to lateral R anterior thigh - scabbed over - no drainage noted, mildly tender upon palpation.  Left elbow significantly swollen, warm to touch, with erythema and purple discoloration. Tender to touch. ROM intact.    Psychiatric: He has a normal mood and affect. His behavior is normal. Judgment and thought content normal.   Nursing note and vitals reviewed.        CRANIAL NERVES     CN III, IV, VI   Pupils are equal, round, and reactive to light.  Extraocular motions are normal.        Significant Labs:   BMP:   Recent Labs   Lab 10/09/19  1915   GLU 94      K 4.0      CO2 23   BUN 12   CREATININE 0.7   CALCIUM 9.6     CBC:   Recent Labs   Lab 10/09/19  1915   WBC 11.02   HGB 14.8   HCT 44.6        Lactic Acid:   Recent Labs   Lab 10/09/19  1938   LACTATE 1.0     Blood cultures pending     Significant Imaging: None noted - Elbow Xray pending

## 2019-10-10 NOTE — PROGRESS NOTES
[facility] Hospital Medicine  Progress Note    Patient Name: Mook Anaya  MRN: [id number]  Patient Class: OP- Observation   Admission Date: 10/9/2019  Length of Stay: 0 days  Attending Physician: Salvador Torres MD  Primary Care Provider: [facility]        Subjective:     Principal Problem:Olecranon bursitis of left elbow        HPI:  Mook Anaya is a 54-year-old male with a past medical history of depression, hypertension, arthritis, hepatitis-C, and GERD who presented to the emergency room tonight with reports of left elbow pain and swelling. Patient reports abscess to left elbow that appeared initially 2 weeks ago, and has increased in size and tenderness. Patient also reports abscess to the axilla region and thigh.  Patient reports pain to the elbow is 10 on a 0-10 pain scale, has not tried over-the-counter medications for pain relief, denies drainage, describes pain as stabbing, pressure, and burning.  Patient denies fever, but does endorse nausea and diarrhea for the past 3 days, no vomiting.  Patient reports history of staph infection, last treated at [facility] approximately 2 years ago.  Patient reports significant tobacco dependency and appreciates the use of crystal meth, last consumed yesterday, patient states he smokes crystal meth approximately 1-2 times per week.  Patient denies intravenous drug use.  Patient appreciates weight loss, stating he does not have electricity at his home or access to groceries.  The patient placed in the hospital overnight for further workup and management of symptoms.  Patient not accompanied by any visitors during the initial interview.      Overview/Hospital Course:  No notes on file    Interval History:  No new issues overnight.  Patient with persistent left elbow pain and swelling which improves with pain medication.  He is NPO awaiting ortho evaluation.    Review of Systems   Constitutional: Positive for activity  change and fatigue. Negative for appetite change, chills and diaphoresis.   HENT: Negative for congestion, hearing loss and sore throat.    Respiratory: Negative for cough, shortness of breath and wheezing.    Cardiovascular: Negative for chest pain and palpitations.   Gastrointestinal: Negative for abdominal pain and nausea.   Genitourinary: Negative for dysuria, flank pain and hematuria.   Musculoskeletal: Positive for joint swelling. Negative for arthralgias, back pain and myalgias (Left elbow).   Skin: Positive for color change.        Redness and swelling to left elbow   Neurological: Negative for dizziness, syncope and light-headedness.   Hematological: Negative for adenopathy.   Psychiatric/Behavioral: Negative for agitation and confusion. The patient is not nervous/anxious.      Objective:     Vital Signs (Most Recent):  Temp: 98.1 °F (36.7 °C) (10/10/19 1603)  Pulse: 62 (10/10/19 1655)  Resp: (!) 29 (10/10/19 1655)  BP: (!) 143/76 (10/10/19 1655)  SpO2: 99 % (10/10/19 1655) Vital Signs (24h Range):  Temp:  [96.3 °F (35.7 °C)-98.6 °F (37 °C)] 98.1 °F (36.7 °C)  Pulse:  [48-81] 62  Resp:  [12-29] 29  SpO2:  [95 %-100 %] 99 %  BP: (100-147)/(58-87) 143/76     Weight: 72 kg (158 lb 11.7 oz)  Body mass index is 20.38 kg/m².    Intake/Output Summary (Last 24 hours) at 10/10/2019 1659  Last data filed at 10/10/2019 1605  Gross per 24 hour   Intake 800 ml   Output 530 ml   Net 270 ml      Physical Exam   Constitutional: He is oriented to person, place, and time. He appears well-developed and well-nourished.   Thin male   HENT:   Head: Normocephalic and atraumatic.   Nose: Nose normal.   Mouth/Throat: Oropharynx is clear and moist.   Eyes: Pupils are equal, round, and reactive to light. Conjunctivae and EOM are normal.   Neck: Normal range of motion. Neck supple. JVD present.   Cardiovascular: Normal rate, regular rhythm, normal heart sounds and intact distal pulses.   No murmur heard.  Pulmonary/Chest: Effort  normal and breath sounds normal. He has no wheezes.   Abdominal: Soft. Bowel sounds are normal. There is no guarding.   Musculoskeletal: Normal range of motion. He exhibits edema and tenderness.   Left elbow erythema, swelling and tenderness with palpation pustular noted to elbow.   Neurological: He is alert and oriented to person, place, and time. Coordination normal.   Skin: Skin is warm and dry. There is erythema.   Psychiatric: He has a normal mood and affect. His behavior is normal.   Nursing note and vitals reviewed.      Significant Labs:   CBC:   Recent Labs   Lab 10/09/19  1915 10/10/19  0424   WBC 11.02 10.33   HGB 14.8 14.2   HCT 44.6 43.6    252     CMP:   Recent Labs   Lab 10/09/19  1915 10/10/19  0424    134*   K 4.0 3.9    103   CO2 23 22*   GLU 94 103   BUN 12 13   CREATININE 0.7 0.7   CALCIUM 9.6 9.0   PROT 7.3  --    ALBUMIN 4.2  --    BILITOT 0.4  --    ALKPHOS 80  --    AST 16  --    ALT 15  --    ANIONGAP 10 9   EGFRNONAA >60 >60       Significant Imaging: I have reviewed and interpreted all pertinent imaging results/findings within the past 24 hours.      Assessment/Plan:      * Olecranon bursitis of left elbow  Does not meet sepsis criteria at time of admission - Blood cultures pending, WBC 11, Lactic 1.0, afebrile. Orthopedic consult placed. IV Clindamycin. NPO at midnight. L elbow xray reviewed with no abscess noted  Patient likely with cellulitis and abscess.  Discussed with Dr. Donahue and patient will go to the OR this afternoon.  Continue IV antibiotic            Tobacco dependence  Assistance with smoking cessation was offered, including:  [x]  Medications  [x]  Counseling  []  Printed Information on Smoking Cessation  []  Referral to a Smoking Cessation Program    Patient was counseled regarding smoking for 3-10 minutes.          Methamphetamine use  Patient reports smoking crystal meth yesterday and a regular use of 1-2 times per week, denies IV drug use. Will  monitor for s/s of withdrawal. IVF hydration. Continuous telemetry monitoring.      Major depressive disorder, recurrent  Chronic, controlled. Will continue home medications.       GERD (gastroesophageal reflux disease)  Chronic, continue home meds.      Essential hypertension  Chronic, controlled. Continue home medications. Will treat with PRN antihypertensives to keep SBP <180.     BP Readings from Last 3 Encounters:   10/09/19 119/70   12/28/17 124/63   03/02/14 (!) 139/94             VTE Risk Mitigation (From admission, onward)         Ordered     IP VTE LOW RISK PATIENT  Once      10/09/19 2130     Place sequential compression device  Until discontinued      10/09/19 2130     Place ABRAM hose  Until discontinued      10/09/19 2130                      Princess Segal NP  Department of Hospital Medicine   [facility]

## 2019-10-10 NOTE — ANESTHESIA PREPROCEDURE EVALUATION
10/10/2019  Mook Anaya is a 54 y.o., male.    Anesthesia Evaluation    I have reviewed the Patient Summary Reports.    I have reviewed the Nursing Notes.   I have reviewed the Medications.     Review of Systems  Anesthesia Hx:  No problems with previous Anesthesia Hx of Anesthetic complications Patient reports smoking crystal meth yesterday and a regular use of 1-2 times per week, denies IV drug use. Will monitor for s/s of withdrawal. IVF hydration. Continuous telemetry monitoring.      Social:  Smoker Smoking Status: Current Every Day Smoker - 30 pack years  Smokeless Tobacco Status: Unknown  Alcohol use: Yes, unspecified volume  Drug use: Methamphetamines; Times per week: 2       Cardiovascular:   Hypertension    Pulmonary:   COPD    Hepatic/GI:   GERD, poorly controlled Liver Disease, Hepatitis, C    Psych:   Psychiatric History          Physical Exam  General:  Well nourished    Airway/Jaw/Neck:  Airway Findings: Mouth Opening: Normal Tongue: Normal  General Airway Assessment: Adult, Good  Mallampati: II  Improves to II with phonation.  TM Distance: 4-6 cm      Dental:  Dental Findings: In tact   Chest/Lungs:  Chest/Lungs Findings: Clear to auscultation, Normal Respiratory Rate     Heart/Vascular:  Heart Findings: Rate: Normal  Rhythm: Regular Rhythm  Sounds: Normal  Heart murmur: negative       Mental Status:  Mental Status Findings:  Cooperative, Alert and Oriented         Anesthesia Plan  Type of Anesthesia, risks & benefits discussed:  Anesthesia Type:  general  Patient's Preference:   Intra-op Monitoring Plan: standard ASA monitors  Intra-op Monitoring Plan Comments:   Post Op Pain Control Plan:   Post Op Pain Control Plan Comments:   Induction:    Beta Blocker:  Patient is not currently on a Beta-Blocker (No further documentation required).       Informed Consent: Patient understands risks  and agrees with Anesthesia plan.  Questions answered. Anesthesia consent signed with patient.  ASA Score: 3  emergent   Day of Surgery Review of History & Physical: I have interviewed and examined the patient. I have reviewed the patient's H&P dated:  There are no significant changes.          Ready For Surgery From Anesthesia Perspective.

## 2019-10-10 NOTE — ED NOTES
Patient identifiers for Mook Anaya checked and correct.  LOC: Patient is awake, alert, and aware of environment with an appropriate affect. Patient is oriented x 3 and speaking appropriately.  APPEARANCE: Patient resting comfortably and in no acute distress. Appears unkept   SKIN: The skin is warm and dry. Patient has normal skin turgor and moist mucus membrances. Skin is intact; no bruising or breakdown noted. Swollen red wounds to left axilla and right upper thigh x 1 week   MUSKULOSKELETAL: Patient is moving all extremities well, no obvious deformities noted. Pulses intact. C/o left elbow sweling and pain x1 week, ROM painful sensation pulses intact  RESPIRATORY: Airway is open and patent. Respirations are spontaneous and non-labored with normal effort and rate.  CARDIAC: Patient has a normal rate and rhythm. No peripheral edema noted. Capillary refill < 3 seconds.  ABDOMEN: No distention noted. Bowel sounds active in all 4 quadrants. Soft and non-tender upon palpation.  NEUROLOGICAL: PERRL. Facial expression is symmetrical. Hand grasps are equal bilaterally. Normal sensation in all extremities when touched with finger.  Allergies reported:   Review of patient's allergies indicates:   Allergen Reactions    Pcn [penicillins] Hives   Pt states he used cocaine 13 years ago and went to [facility], quit drugs until last year he started to smoke meth. States last meth use was last night. Has been calm and cooperative in ED.

## 2019-10-10 NOTE — ANESTHESIA POSTPROCEDURE EVALUATION
Anesthesia Post Evaluation    Patient: Mook Anaya    Procedure(s) Performed: Procedure(s) (LRB):  INCISION AND DRAINAGE, ABSCESS (Left)    Final Anesthesia Type: general  Patient location during evaluation: PACU  Patient participation: Yes- Able to Participate  Level of consciousness: awake and alert and oriented  Post-procedure vital signs: reviewed and stable  Pain management: adequate  Airway patency: patent  PONV status at discharge: No PONV  Anesthetic complications: no      Cardiovascular status: blood pressure returned to baseline  Respiratory status: unassisted, spontaneous ventilation and room air  Hydration status: euvolemic  Follow-up not needed.          Vitals Value Taken Time   /86 10/10/2019  4:35 PM   Temp 36.7 °C (98.1 °F) 10/10/2019  4:03 PM   Pulse 68 10/10/2019  4:35 PM   Resp 18 10/10/2019  4:35 PM   SpO2 99 % 10/10/2019  4:35 PM   Vitals shown include unvalidated device data.      No case tracking events are documented in the log.      Pain/Lynn Score: Pain Rating Prior to Med Admin: 3 (10/10/2019  5:32 AM)  Pain Rating Post Med Admin: 2 (10/10/2019  6:00 AM)         Never

## 2019-10-10 NOTE — H&P
[facility] Hospital Medicine  History & Physical    Patient Name: Mook Anaya  MRN: [id number]  Admission Date: 10/9/2019  Attending Physician: Clarence Smith MD   Primary Care Provider: Primary Doctor No         Patient information was obtained from patient and ER records.     Subjective:     Principal Problem:Olecranon bursitis of left elbow    Chief Complaint:   Chief Complaint   Patient presents with    Abscess     x 1 week, left arm, right thigh, and left arm pit        HPI: Mook Anaya is a 54-year-old male with a past medical history of depression, hypertension, arthritis, hepatitis-C, and GERD who presented to the emergency room tonight with reports of left elbow pain and swelling. Patient reports abscess to left elbow that appeared initially 2 weeks ago, and has increased in size and tenderness. Patient also reports abscess to the axilla region and thigh.  Patient reports pain to the elbow is 10 on a 0-10 pain scale, has not tried over-the-counter medications for pain relief, denies drainage, describes pain as stabbing, pressure, and burning.  Patient denies fever, but does endorse nausea and diarrhea for the past 3 days, no vomiting.  Patient reports history of staph infection, last treated at [facility] approximately 2 years ago.  Patient reports significant tobacco dependency and appreciates the use of crystal meth, last consumed yesterday, patient states he smokes crystal meth approximately 1-2 times per week.  Patient denies intravenous drug use.  Patient appreciates weight loss, stating he does not have electricity at his home or access to groceries.  The patient placed in the hospital overnight for further workup and management of symptoms.  Patient not accompanied by any visitors during the initial interview.      Past Medical History:   Diagnosis Date    COPD (chronic obstructive pulmonary disease)     Depression     GERD (gastroesophageal reflux  disease)     Hepatitis C     Hypertension        Past Surgical History:   Procedure Laterality Date    MULTIPLE TOOTH EXTRACTIONS      TONSILLECTOMY         Review of patient's allergies indicates:   Allergen Reactions    Pcn [penicillins] Hives       No current facility-administered medications on file prior to encounter.      Current Outpatient Medications on File Prior to Encounter   Medication Sig    amLODIPine (NORVASC) 2.5 MG tablet Take 2.5 mg by mouth once daily.    gabapentin (NEURONTIN) 800 MG tablet Take 800 mg by mouth once daily.     lisinopril (PRINIVIL,ZESTRIL) 40 MG tablet Take 40 mg by mouth once daily.     omeprazole (PRILOSEC) 20 MG capsule Take 20 mg by mouth once daily.    citalopram (CELEXA) 40 MG tablet Take 40 mg by mouth once daily.    [DISCONTINUED] albuterol (PROVENTIL HFA) 90 mcg/actuation inhaler Inhale 2 puffs into the lungs every 6 (six) hours as needed.    [DISCONTINUED] aspirin 325 MG tablet Take 325 mg by mouth once daily.    [DISCONTINUED] diazepam (VALIUM) 5 MG tablet Take 1 tablet (5 mg total) by mouth every 8 (eight) hours as needed (muscle spasm).    [DISCONTINUED] lansoprazole (PREVACID) 30 MG capsule Take 30 mg by mouth once daily.    [DISCONTINUED] tramadol (ULTRAM) 50 mg tablet Take 1 tablet (50 mg total) by mouth every 6 (six) hours as needed for Pain.    [DISCONTINUED] zolpidem (AMBIEN) 5 MG Tab Take 10 mg by mouth nightly as needed.     Family History     Problem Relation (Age of Onset)    COPD Father    Heart disease Mother, Father    Mental illness Brother    Stroke Mother        Tobacco Use    Smoking status: Current Every Day Smoker     Packs/day: 1.00     Years: 30.00     Pack years: 30.00     Types: Cigarettes   Substance and Sexual Activity    Alcohol use: Yes     Comment: occasionally    Drug use: Yes     Frequency: 2.0 times per week     Types: Methamphetamines     Comment: Reports use twice weekly, smokes crystal meth    Sexual activity: Yes      Partners: Female     Review of Systems   Constitutional: Negative for activity change, appetite change, chills, fatigue and fever.   HENT: Negative for congestion, sinus pressure, sinus pain and sore throat.    Eyes: Negative for photophobia and visual disturbance.   Respiratory: Negative for cough, choking, chest tightness, shortness of breath and wheezing.    Cardiovascular: Negative for chest pain, palpitations and leg swelling.   Gastrointestinal: Positive for diarrhea and nausea. Negative for abdominal distention, abdominal pain, blood in stool, constipation and vomiting.   Genitourinary: Negative for difficulty urinating, dysuria, flank pain and hematuria.   Musculoskeletal: Positive for back pain and joint swelling. Negative for gait problem.        Chronic back pain  L elbow swelling, tenderness.     Skin: Positive for wound. Negative for rash.        Skin abscess to R thigh, L axilla, and L elbow.   Neurological: Negative for dizziness, syncope, weakness, light-headedness, numbness and headaches.   Psychiatric/Behavioral: Negative for confusion. The patient is not nervous/anxious.      Objective:     Vital Signs (Most Recent):  Temp: 98.2 °F (36.8 °C) (10/09/19 1845)  Pulse: 81 (10/09/19 1845)  Resp: 16 (10/09/19 1845)  BP: 123/77 (10/09/19 1845)  SpO2: 97 % (10/09/19 1845) Vital Signs (24h Range):  Temp:  [98.2 °F (36.8 °C)] 98.2 °F (36.8 °C)  Pulse:  [81] 81  Resp:  [16] 16  SpO2:  [97 %] 97 %  BP: (123)/(77) 123/77     Weight: 72.3 kg (159 lb 6.3 oz)  Body mass index is 20.46 kg/m².    Physical Exam   Constitutional: He is oriented to person, place, and time. No distress.   Unkempt   HENT:   Head: Normocephalic and atraumatic.   Poor dentition     Eyes: Pupils are equal, round, and reactive to light. EOM are normal. Right eye exhibits no discharge. Left eye exhibits no discharge.   Neck: Normal range of motion. No JVD present.   Cardiovascular: Normal rate, regular rhythm, normal heart sounds and  intact distal pulses.   No murmur heard.  Pulmonary/Chest: Effort normal and breath sounds normal. No respiratory distress. He has no wheezes. He has no rales. He exhibits no tenderness.   Abdominal: Soft. Bowel sounds are normal. He exhibits no distension. There is no tenderness. There is no rebound and no guarding.   Genitourinary:   Genitourinary Comments: Exam Deferred      Musculoskeletal: Normal range of motion. He exhibits edema and deformity. He exhibits no tenderness.   L elbow swelling   Neurological: He is alert and oriented to person, place, and time. No cranial nerve deficit or sensory deficit.   Skin: Skin is warm and dry. Capillary refill takes 2 to 3 seconds. No rash noted. He is not diaphoretic. There is erythema.   2 small abscess noted to L axilla, tender upon palpation, no drainage noted.  1 small abscess noted to lateral R anterior thigh - scabbed over - no drainage noted, mildly tender upon palpation.  Left elbow significantly swollen, warm to touch, with erythema and purple discoloration. Tender to touch. ROM intact.    Psychiatric: He has a normal mood and affect. His behavior is normal. Judgment and thought content normal.   Nursing note and vitals reviewed.        CRANIAL NERVES     CN III, IV, VI   Pupils are equal, round, and reactive to light.  Extraocular motions are normal.        Significant Labs:   BMP:   Recent Labs   Lab 10/09/19  1915   GLU 94      K 4.0      CO2 23   BUN 12   CREATININE 0.7   CALCIUM 9.6     CBC:   Recent Labs   Lab 10/09/19  1915   WBC 11.02   HGB 14.8   HCT 44.6        Lactic Acid:   Recent Labs   Lab 10/09/19  1938   LACTATE 1.0     Blood cultures pending     Significant Imaging: None noted - Elbow Xray pending     Assessment/Plan:     * Olecranon bursitis of left elbow  Does not meet sepsis criteria at time of admission - Blood cultures pending, WBC 11, Lactic 1.0, afebrile. Orthopedic consult placed. IV Clindamycin. NPO at midnight. L  elbow xray pending.         Tobacco dependence  Assistance with smoking cessation was offered, including:  [x]  Medications  [x]  Counseling  []  Printed Information on Smoking Cessation  []  Referral to a Smoking Cessation Program    Patient was counseled regarding smoking for 3-10 minutes.          Methamphetamine use  Patient reports smoking crystal meth yesterday and a regular use of 1-2 times per week, denies IV drug use. Will monitor for s/s of withdrawal. IVF hydration. Continuous telemetry monitoring.      Major depressive disorder, recurrent  Chronic, controlled. Will continue home medications.       GERD (gastroesophageal reflux disease)  Chronic, continue home meds.      Essential hypertension  Chronic, controlled. Continue home medications. Will treat with PRN antihypertensives to keep SBP <180.     BP Readings from Last 3 Encounters:   10/09/19 119/70   12/28/17 124/63   03/02/14 (!) 139/94             VTE Risk Mitigation (From admission, onward)         Ordered     IP VTE LOW RISK PATIENT  Once      10/09/19 2130     Place sequential compression device  Until discontinued      10/09/19 2130     Place ABRAM hose  Until discontinued      10/09/19 2130               Time spent seeing patient( greater than 1/2 spent in direct contact) : 50 minutes     Iesha Sunshine NP  Department of Hospital Medicine   [facility]

## 2019-10-10 NOTE — ED PROVIDER NOTES
Encounter Date: 10/9/2019    SCRIBE #1 NOTE: IMery and am scribing for, and in the presence of, Garret Pedro MD.       History     Chief Complaint   Patient presents with    Abscess     x 1 week, left arm, right thigh, and left arm pit     Time seen by provider: 6:58 PM on 10/09/2019    Mook Anaya is a 54 y.o. male with PMHx of HTN who presents to the ED with an onset of abscess-like wounds on his right thigh, left arm, and left armpit since 2 weeks ago. He reports that he has tried to use antibiotic cream on them with no relief. He reports the pain from these areas is worsening. The patient denies any other symptoms at this time. No pertinent PSHx. Drug allergy to Penicillins.     The history is provided by the patient.     Review of patient's allergies indicates:   Allergen Reactions    Pcn [penicillins] Hives     Past Medical History:   Diagnosis Date    COPD (chronic obstructive pulmonary disease)     Depression     Hypertension      Past Surgical History:   Procedure Laterality Date    MULTIPLE TOOTH EXTRACTIONS      TONSILLECTOMY       No family history on file.  Social History     Tobacco Use    Smoking status: Current Every Day Smoker     Packs/day: 1.00     Types: Cigarettes   Substance Use Topics    Alcohol use: Yes     Comment: occasionally    Drug use: No     Review of Systems   Constitutional: Negative for fever.   HENT: Negative for sore throat.    Respiratory: Negative for shortness of breath.    Cardiovascular: Negative for chest pain.   Gastrointestinal: Negative for nausea.   Genitourinary: Negative for dysuria.   Musculoskeletal: Negative for back pain.   Skin: Positive for wound (multiple abscesses; right thigh, left arm, left armpit). Negative for rash.   Neurological: Negative for weakness.   Hematological: Does not bruise/bleed easily.       Physical Exam     Initial Vitals [10/09/19 1845]   BP Pulse Resp Temp SpO2   123/77 81 16 98.2 °F (36.8 °C) 97 %       MAP       --         Physical Exam    Nursing note and vitals reviewed.  Constitutional: He appears well-developed and well-nourished. No distress.   HENT:   Head: Normocephalic and atraumatic.   Eyes: Conjunctivae and EOM are normal. Pupils are equal, round, and reactive to light.   Neck: Neck supple.   Cardiovascular: Normal rate, regular rhythm and normal heart sounds. Exam reveals no gallop and no friction rub.    No murmur heard.  Pulmonary/Chest: Breath sounds normal. No respiratory distress. He has no wheezes. He has no rhonchi. He has no rales.   Abdominal: Soft. Bowel sounds are normal. He exhibits no distension. There is no tenderness.   Musculoskeletal: Normal range of motion. He exhibits no edema or tenderness.   Neurological: He is alert and oriented to person, place, and time.   Skin: Skin is warm and dry. Abscess noted.   Abscess on the right anterior thigh that is slightly indurated. There is no drainage. Peeling skin noted. 2 cm dark head noted.   Pinpoint pustule on the posterior olecranon bursa with surrounding erythema. The area is swollen and inflamed.   Small, marble-sized pinpoint pustule in the left axilla with no drainage.    Psychiatric: He has a normal mood and affect.         ED Course   Procedures  Labs Reviewed   CULTURE, BLOOD   CULTURE, BLOOD   CBC W/ AUTO DIFFERENTIAL   COMPREHENSIVE METABOLIC PANEL          Imaging Results    None          Medical Decision Making:   History:   Old Medical Records: I decided to obtain old medical records.  Clinical Tests:   Lab Tests: Ordered and Reviewed  Pt has olecranon bursitis that will need to be drained by the orthopedist.  I will admit him for iv abx.  Hx of IVDA years ago.  Papule is over the posterior left elbow.  Other small furuncle don't need to be drained at this time in the left axilla and right thigh.            Scribe Attestation:   Scribe #1: I performed the above scribed service and the documentation accurately describes the  services I performed. I attest to the accuracy of the note.    I, Dr. Garret Pedro personally performed the services described in this documentation. All medical record entries made by the scribe were at my direction and in my presence.  I have reviewed the chart and agree that the record reflects my personal performance and is accurate and complete. Garret Pedro MD.  7:29 PM 10/09/2019    DISCLAIMER: This note was prepared with Dragon NaturallySpeaking voice recognition transcription software. Garbled syntax, mangled pronouns, and other bizarre constructions may be attributed to that software system            Clinical Impression:       ICD-10-CM ICD-9-CM   1. Olecranon bursitis of left elbow M70.22 726.33                                Garret Pedro MD  10/09/19 1930

## 2019-10-10 NOTE — NURSING
Patient c/o increase in pain with abscess. BARBI Walsh NP notified. New orders given and implemented.

## 2019-10-10 NOTE — PLAN OF CARE
Hospital follow up scheduled with PCP. AVS updated.        10/10/19 6926   Discharge Assessment   Assessment Type Discharge Planning Reassessment

## 2019-10-10 NOTE — OP NOTE
DATE OF PROCEDURE:  10/10/2019    PREOPERATIVE DIAGNOSIS:  Left elbow septic abscess.    POSTOPERATIVE DIAGNOSIS:  Left elbow septic olecranon bursitis.    PROCEDURE PERFORMED:  Left elbow olecranon bursectomy.    SURGEON:  Louis Donahue M.D.    ASSISTANT:  Katiuska Castillo.    ANESTHESIA:  General.    HISTORY:  Mr. Anaya is a 54-year-old gentleman who presented to the Emergency   Room with complaint of left elbow pain.  His examination was consistent with an   abscess overlying the left elbow and olecranon bursa.  We discussed treatment   options and he elected to proceed with evacuation of the abscess.  The risks and   benefits of surgical intervention including the potential for incomplete pain   relief and the need for further procedures were explained to the patient who   expressed he understood and wished to proceed.  Informed consent was obtained.    PROCEDURE IN DETAIL:  After verifying informed consent and correct site, the   patient was brought back to the Operating Room, placed on the OR table in supine   position.  Preoperative IV antibiotics had already been administered and a   timeout was performed.  The patient's left upper extremity was then prepped and   draped in sterile fashion.  We began by creating a 2 cm incision directly over   the area of the abscess.  We had an immediate return of purulent material.    Cultures were taken.  Based on the amount of purulence returned, we extended the   incision in both directions.  The abscess tracked down into the olecranon   bursa.  We elected to proceed with an olecranon bursectomy.  The incision was   extended appropriately.  The skin laps were elevated.  This allowed us to excise   the entire olecranon bursa.  Once the bursa was out, there was no further   purulent material in the area of the incision.  We then copiously irrigated the   wound using a Pulsavac of 3 liters.  Following that, we inspected the wound and   obtained good hemostasis.  There was  no evidence of any more purulence.  We then   did our final washout and closed the wound using nylon sutures.  A sterile   dressing was applied along with a sling.  The patient was then awoken from   anesthesia, extubated, and brought to the PACU in good condition.    TOTAL TOURNIQUET TIME:  None.    DRAINS:  None.    SPECIMENS:  Cultures x2.    ESTIMATED BLOOD LOSS:  30 mL.    POSTOPERATIVE PLAN:  He will be readmitted for postoperative care.      DARIUS/IN  dd: 10/10/2019 16:00:18 (CDT)  td: 10/10/2019 18:36:39 (CDT)  Doc ID   #[id number]  Job ID #[id number]    CC:

## 2019-10-11 LAB
ANION GAP SERPL CALC-SCNC: 9 MMOL/L (ref 8–16)
BASOPHILS # BLD AUTO: 0.02 K/UL (ref 0–0.2)
BASOPHILS # BLD AUTO: 0.02 K/UL (ref 0–0.2)
BASOPHILS NFR BLD: 0.2 % (ref 0–1.9)
BASOPHILS NFR BLD: 0.2 % (ref 0–1.9)
BUN SERPL-MCNC: 13 MG/DL (ref 6–20)
CALCIUM SERPL-MCNC: 9.2 MG/DL (ref 8.7–10.5)
CHLORIDE SERPL-SCNC: 104 MMOL/L (ref 95–110)
CO2 SERPL-SCNC: 25 MMOL/L (ref 23–29)
CREAT SERPL-MCNC: 0.7 MG/DL (ref 0.5–1.4)
DIFFERENTIAL METHOD: ABNORMAL
DIFFERENTIAL METHOD: ABNORMAL
EOSINOPHIL # BLD AUTO: 0 K/UL (ref 0–0.5)
EOSINOPHIL # BLD AUTO: 0 K/UL (ref 0–0.5)
EOSINOPHIL NFR BLD: 0.1 % (ref 0–8)
EOSINOPHIL NFR BLD: 0.2 % (ref 0–8)
ERYTHROCYTE [DISTWIDTH] IN BLOOD BY AUTOMATED COUNT: 13.3 % (ref 11.5–14.5)
ERYTHROCYTE [DISTWIDTH] IN BLOOD BY AUTOMATED COUNT: 13.4 % (ref 11.5–14.5)
EST. GFR  (AFRICAN AMERICAN): >60 ML/MIN/1.73 M^2
EST. GFR  (NON AFRICAN AMERICAN): >60 ML/MIN/1.73 M^2
GLUCOSE SERPL-MCNC: 136 MG/DL (ref 70–110)
HCT VFR BLD AUTO: 43.3 % (ref 40–54)
HCT VFR BLD AUTO: 44.4 % (ref 40–54)
HGB BLD-MCNC: 14.3 G/DL (ref 14–18)
HGB BLD-MCNC: 14.4 G/DL (ref 14–18)
IMM GRANULOCYTES # BLD AUTO: 0.04 K/UL (ref 0–0.04)
IMM GRANULOCYTES # BLD AUTO: 0.04 K/UL (ref 0–0.04)
LYMPHOCYTES # BLD AUTO: 1.4 K/UL (ref 1–4.8)
LYMPHOCYTES # BLD AUTO: 1.4 K/UL (ref 1–4.8)
LYMPHOCYTES NFR BLD: 12.4 % (ref 18–48)
LYMPHOCYTES NFR BLD: 12.4 % (ref 18–48)
MAGNESIUM SERPL-MCNC: 2 MG/DL (ref 1.6–2.6)
MCH RBC QN AUTO: 30.6 PG (ref 27–31)
MCH RBC QN AUTO: 31 PG (ref 27–31)
MCHC RBC AUTO-ENTMCNC: 32.4 G/DL (ref 32–36)
MCHC RBC AUTO-ENTMCNC: 33 G/DL (ref 32–36)
MCV RBC AUTO: 94 FL (ref 82–98)
MCV RBC AUTO: 95 FL (ref 82–98)
MONOCYTES # BLD AUTO: 1 K/UL (ref 0.3–1)
MONOCYTES # BLD AUTO: 1 K/UL (ref 0.3–1)
MONOCYTES NFR BLD: 8.5 % (ref 4–15)
MONOCYTES NFR BLD: 8.6 % (ref 4–15)
NEUTROPHILS # BLD AUTO: 8.9 K/UL (ref 1.8–7.7)
NEUTROPHILS # BLD AUTO: 9 K/UL (ref 1.8–7.7)
NEUTROPHILS NFR BLD: 78.3 % (ref 38–73)
NEUTROPHILS NFR BLD: 78.4 % (ref 38–73)
NRBC BLD-RTO: 0 /100 WBC
NRBC BLD-RTO: 0 /100 WBC
PLATELET # BLD AUTO: 221 K/UL (ref 150–350)
PLATELET # BLD AUTO: 224 K/UL (ref 150–350)
PMV BLD AUTO: 11.5 FL (ref 9.2–12.9)
PMV BLD AUTO: 11.7 FL (ref 9.2–12.9)
POTASSIUM SERPL-SCNC: 4.4 MMOL/L (ref 3.5–5.1)
RBC # BLD AUTO: 4.61 M/UL (ref 4.6–6.2)
RBC # BLD AUTO: 4.7 M/UL (ref 4.6–6.2)
SODIUM SERPL-SCNC: 138 MMOL/L (ref 136–145)
WBC # BLD AUTO: 11.34 K/UL (ref 3.9–12.7)
WBC # BLD AUTO: 11.48 K/UL (ref 3.9–12.7)

## 2019-10-11 PROCEDURE — 80048 BASIC METABOLIC PNL TOTAL CA: CPT

## 2019-10-11 PROCEDURE — 94761 N-INVAS EAR/PLS OXIMETRY MLT: CPT

## 2019-10-11 PROCEDURE — 25000003 PHARM REV CODE 250: Performed by: ORTHOPAEDIC SURGERY

## 2019-10-11 PROCEDURE — 36415 COLL VENOUS BLD VENIPUNCTURE: CPT

## 2019-10-11 PROCEDURE — S4991 NICOTINE PATCH NONLEGEND: HCPCS | Performed by: NURSE PRACTITIONER

## 2019-10-11 PROCEDURE — 85025 COMPLETE CBC W/AUTO DIFF WBC: CPT

## 2019-10-11 PROCEDURE — 96376 TX/PRO/DX INJ SAME DRUG ADON: CPT

## 2019-10-11 PROCEDURE — G0378 HOSPITAL OBSERVATION PER HR: HCPCS

## 2019-10-11 PROCEDURE — S0077 INJECTION, CLINDAMYCIN PHOSP: HCPCS | Performed by: NURSE PRACTITIONER

## 2019-10-11 PROCEDURE — 25000003 PHARM REV CODE 250: Performed by: NURSE PRACTITIONER

## 2019-10-11 PROCEDURE — 83735 ASSAY OF MAGNESIUM: CPT

## 2019-10-11 RX ORDER — IBUPROFEN 200 MG
1 TABLET ORAL DAILY
Status: DISCONTINUED | OUTPATIENT
Start: 2019-10-11 | End: 2019-10-12 | Stop reason: HOSPADM

## 2019-10-11 RX ADMIN — HYDROCODONE BITARTRATE AND ACETAMINOPHEN 1 TABLET: 5; 325 TABLET ORAL at 03:10

## 2019-10-11 RX ADMIN — CITALOPRAM HYDROBROMIDE 40 MG: 40 TABLET ORAL at 08:10

## 2019-10-11 RX ADMIN — IBUPROFEN 800 MG: 400 TABLET ORAL at 03:10

## 2019-10-11 RX ADMIN — CLINDAMYCIN IN 5 PERCENT DEXTROSE 900 MG: 18 INJECTION, SOLUTION INTRAVENOUS at 12:10

## 2019-10-11 RX ADMIN — HYDROCODONE BITARTRATE AND ACETAMINOPHEN 1 TABLET: 5; 325 TABLET ORAL at 08:10

## 2019-10-11 RX ADMIN — IBUPROFEN 800 MG: 400 TABLET ORAL at 08:10

## 2019-10-11 RX ADMIN — NICOTINE 1 PATCH: 21 PATCH TRANSDERMAL at 08:10

## 2019-10-11 RX ADMIN — HYDROCODONE BITARTRATE AND ACETAMINOPHEN 1 TABLET: 10; 325 TABLET ORAL at 04:10

## 2019-10-11 RX ADMIN — CLINDAMYCIN IN 5 PERCENT DEXTROSE 900 MG: 18 INJECTION, SOLUTION INTRAVENOUS at 04:10

## 2019-10-11 RX ADMIN — GABAPENTIN 800 MG: 400 CAPSULE ORAL at 08:10

## 2019-10-11 RX ADMIN — GABAPENTIN 800 MG: 400 CAPSULE ORAL at 03:10

## 2019-10-11 RX ADMIN — CLINDAMYCIN IN 5 PERCENT DEXTROSE 900 MG: 18 INJECTION, SOLUTION INTRAVENOUS at 08:10

## 2019-10-11 RX ADMIN — PANTOPRAZOLE SODIUM 40 MG: 40 TABLET, DELAYED RELEASE ORAL at 08:10

## 2019-10-11 NOTE — PLAN OF CARE
Patient alert and oriented resting in bed. NAD. Denies pain or SOB. VSS. Urinal at bedside. Room air. Bed alarm active. Plan of care reviewed with patient. Verbalizes understanding.Call light in reach. Pt free from fall or injury. Will monitor.

## 2019-10-11 NOTE — PROGRESS NOTES
Clinical Pharmacy Chart Review Note    Mook Anaya is a 54 y.o. male was rounded on 10/11/2019 by a pharmacist alongside the hospitalist during bedside rounds. The patient was thoroughly reviewed and monitored by the hospitalist and pharmacist to ensure medication for specific disease states and/or problems were properly prescribed, dispensed, administered, and monitored. Identified dosing adjustments were addressed, in addition to assessment of proper dosing based on current renal/hepatic function. Pharmacy will provide medication recommendations whenever necessary to the provider to ensure positive patient outcomes. High risk adverse effects were reviewed will all healthcare providers involved in this patient's care. A medication reconciliation and drug-interaction check has been completed by a pharmacist. Discrepancies will be addressed and resolved on an on-going bases and pharmacy will continue to follow this patient until discharge. Feel free to contact us if you have any questions.    Yolanda Sesay, PharmD  [facility] PGY1 Pharmacy Resident  [phone]

## 2019-10-11 NOTE — SUBJECTIVE & OBJECTIVE
Interval History:  No new issues overnight.  Patient status post left elbow I&D on 10/10/2019.  He reports pain is controlled with pain medication.  Awaiting cultures..    Review of Systems   Constitutional: Positive for activity change and fatigue. Negative for appetite change, chills and diaphoresis.   HENT: Negative for congestion, hearing loss and sore throat.    Respiratory: Negative for cough, shortness of breath and wheezing.    Cardiovascular: Negative for chest pain and palpitations.   Gastrointestinal: Negative for abdominal pain and nausea.   Genitourinary: Negative for dysuria, flank pain and hematuria.   Musculoskeletal: Positive for joint swelling. Negative for arthralgias, back pain and myalgias (Left elbow).   Skin: Positive for color change.        Redness and swelling to left elbow   Neurological: Negative for dizziness, syncope and light-headedness.   Hematological: Negative for adenopathy.   Psychiatric/Behavioral: Negative for agitation and confusion. The patient is not nervous/anxious.      Objective:     Vital Signs (Most Recent):  Temp: 96.9 °F (36.1 °C) (10/11/19 1110)  Pulse: (!) 57 (10/11/19 1110)  Resp: 19 (10/11/19 1110)  BP: 116/73 (10/11/19 1110)  SpO2: 98 % (10/11/19 1110) Vital Signs (24h Range):  Temp:  [96.1 °F (35.6 °C)-98.2 °F (36.8 °C)] 96.9 °F (36.1 °C)  Pulse:  [48-69] 57  Resp:  [12-29] 19  SpO2:  [96 %-100 %] 98 %  BP: (108-156)/(58-87) 116/73     Weight: 72 kg (158 lb 11.7 oz)  Body mass index is 20.38 kg/m².    Intake/Output Summary (Last 24 hours) at 10/11/2019 1456  Last data filed at 10/11/2019 0600  Gross per 24 hour   Intake 1298.75 ml   Output 1305 ml   Net -6.25 ml      Physical Exam   Constitutional: He is oriented to person, place, and time. He appears well-developed and well-nourished.   Thin male   HENT:   Head: Normocephalic and atraumatic.   Nose: Nose normal.   Mouth/Throat: Oropharynx is clear and moist.   Eyes: Pupils are equal, round, and reactive to  light. Conjunctivae and EOM are normal.   Neck: Normal range of motion. Neck supple. JVD present.   Cardiovascular: Normal rate, regular rhythm, normal heart sounds and intact distal pulses.   No murmur heard.  Pulmonary/Chest: Effort normal and breath sounds normal. He has no wheezes.   Abdominal: Soft. Bowel sounds are normal. There is no guarding.   Musculoskeletal: Normal range of motion. He exhibits edema and tenderness.   Left elbow erythema, swelling and tenderness with palpation pustular noted to elbow.   Neurological: He is alert and oriented to person, place, and time. Coordination normal.   Skin: Skin is warm and dry. There is erythema.   Psychiatric: He has a normal mood and affect. His behavior is normal.   Nursing note and vitals reviewed.      Significant Labs:   CBC:   Recent Labs   Lab 10/09/19  1915 10/10/19  0424 10/11/19  0444   WBC 11.02 10.33 11.48  11.34   HGB 14.8 14.2 14.4  14.3   HCT 44.6 43.6 44.4  43.3    252 221  224     CMP:   Recent Labs   Lab 10/09/19  1915 10/10/19  0424 10/11/19  0444    134* 138   K 4.0 3.9 4.4    103 104   CO2 23 22* 25   GLU 94 103 136*   BUN 12 13 13   CREATININE 0.7 0.7 0.7   CALCIUM 9.6 9.0 9.2   PROT 7.3  --   --    ALBUMIN 4.2  --   --    BILITOT 0.4  --   --    ALKPHOS 80  --   --    AST 16  --   --    ALT 15  --   --    ANIONGAP 10 9 9   EGFRNONAA >60 >60 >60       Significant Imaging: I have reviewed and interpreted all pertinent imaging results/findings within the past 24 hours.

## 2019-10-11 NOTE — PROGRESS NOTES
[facility] Hospital Medicine  Progress Note    Patient Name: Mook Anaya  MRN: [id number]  Patient Class: OP- Observation   Admission Date: 10/9/2019  Length of Stay: 0 days  Attending Physician: Salvador Torres MD  Primary Care Provider: [facility]        Subjective:     Principal Problem:Olecranon bursitis of left elbow        HPI:  Mook Anaya is a 54-year-old male with a past medical history of depression, hypertension, arthritis, hepatitis-C, and GERD who presented to the emergency room tonight with reports of left elbow pain and swelling. Patient reports abscess to left elbow that appeared initially 2 weeks ago, and has increased in size and tenderness. Patient also reports abscess to the axilla region and thigh.  Patient reports pain to the elbow is 10 on a 0-10 pain scale, has not tried over-the-counter medications for pain relief, denies drainage, describes pain as stabbing, pressure, and burning.  Patient denies fever, but does endorse nausea and diarrhea for the past 3 days, no vomiting.  Patient reports history of staph infection, last treated at [facility] approximately 2 years ago.  Patient reports significant tobacco dependency and appreciates the use of crystal meth, last consumed yesterday, patient states he smokes crystal meth approximately 1-2 times per week.  Patient denies intravenous drug use.  Patient appreciates weight loss, stating he does not have electricity at his home or access to groceries.  The patient placed in the hospital overnight for further workup and management of symptoms.  Patient not accompanied by any visitors during the initial interview.      Overview/Hospital Course:  No notes on file    Interval History:  No new issues overnight.  Patient status post left elbow I&D on 10/10/2019.  He reports pain is controlled with pain medication.  Awaiting cultures..    Review of Systems   Constitutional: Positive for activity  change and fatigue. Negative for appetite change, chills and diaphoresis.   HENT: Negative for congestion, hearing loss and sore throat.    Respiratory: Negative for cough, shortness of breath and wheezing.    Cardiovascular: Negative for chest pain and palpitations.   Gastrointestinal: Negative for abdominal pain and nausea.   Genitourinary: Negative for dysuria, flank pain and hematuria.   Musculoskeletal: Positive for joint swelling. Negative for arthralgias, back pain and myalgias (Left elbow).   Skin: Positive for color change.        Redness and swelling to left elbow   Neurological: Negative for dizziness, syncope and light-headedness.   Hematological: Negative for adenopathy.   Psychiatric/Behavioral: Negative for agitation and confusion. The patient is not nervous/anxious.      Objective:     Vital Signs (Most Recent):  Temp: 96.9 °F (36.1 °C) (10/11/19 1110)  Pulse: (!) 57 (10/11/19 1110)  Resp: 19 (10/11/19 1110)  BP: 116/73 (10/11/19 1110)  SpO2: 98 % (10/11/19 1110) Vital Signs (24h Range):  Temp:  [96.1 °F (35.6 °C)-98.2 °F (36.8 °C)] 96.9 °F (36.1 °C)  Pulse:  [48-69] 57  Resp:  [12-29] 19  SpO2:  [96 %-100 %] 98 %  BP: (108-156)/(58-87) 116/73     Weight: 72 kg (158 lb 11.7 oz)  Body mass index is 20.38 kg/m².    Intake/Output Summary (Last 24 hours) at 10/11/2019 1456  Last data filed at 10/11/2019 0600  Gross per 24 hour   Intake 1298.75 ml   Output 1305 ml   Net -6.25 ml      Physical Exam   Constitutional: He is oriented to person, place, and time. He appears well-developed and well-nourished.   Thin male   HENT:   Head: Normocephalic and atraumatic.   Nose: Nose normal.   Mouth/Throat: Oropharynx is clear and moist.   Eyes: Pupils are equal, round, and reactive to light. Conjunctivae and EOM are normal.   Neck: Normal range of motion. Neck supple. JVD present.   Cardiovascular: Normal rate, regular rhythm, normal heart sounds and intact distal pulses.   No murmur heard.  Pulmonary/Chest: Effort  normal and breath sounds normal. He has no wheezes.   Abdominal: Soft. Bowel sounds are normal. There is no guarding.   Musculoskeletal: Normal range of motion. He exhibits edema and tenderness.   Left elbow erythema, swelling and tenderness with palpation pustular noted to elbow.   Neurological: He is alert and oriented to person, place, and time. Coordination normal.   Skin: Skin is warm and dry. There is erythema.   Psychiatric: He has a normal mood and affect. His behavior is normal.   Nursing note and vitals reviewed.      Significant Labs:   CBC:   Recent Labs   Lab 10/09/19  1915 10/10/19  0424 10/11/19  0444   WBC 11.02 10.33 11.48  11.34   HGB 14.8 14.2 14.4  14.3   HCT 44.6 43.6 44.4  43.3    252 221  224     CMP:   Recent Labs   Lab 10/09/19  1915 10/10/19  0424 10/11/19  0444    134* 138   K 4.0 3.9 4.4    103 104   CO2 23 22* 25   GLU 94 103 136*   BUN 12 13 13   CREATININE 0.7 0.7 0.7   CALCIUM 9.6 9.0 9.2   PROT 7.3  --   --    ALBUMIN 4.2  --   --    BILITOT 0.4  --   --    ALKPHOS 80  --   --    AST 16  --   --    ALT 15  --   --    ANIONGAP 10 9 9   EGFRNONAA >60 >60 >60       Significant Imaging: I have reviewed and interpreted all pertinent imaging results/findings within the past 24 hours.      Assessment/Plan:      * Olecranon bursitis of left elbow  Does not meet sepsis criteria at time of admission - Blood cultures pending, WBC 11, Lactic 1.0, afebrile. Orthopedic consult placed. IV Clindamycin. NPO at midnight. L elbow xray reviewed with no abscess noted  Patient likely with cellulitis and abscess. Continue IV antibiotic    Status post I&D on 10/10/2019.  Awaiting cultures          Tobacco dependence  Assistance with smoking cessation was offered, including:  [x]  Medications  [x]  Counseling  []  Printed Information on Smoking Cessation  []  Referral to a Smoking Cessation Program    Patient was counseled regarding smoking for 3-10 minutes.          Methamphetamine  use  Patient reports smoking crystal meth yesterday and a regular use of 1-2 times per week, denies IV drug use. Will monitor for s/s of withdrawal. IVF hydration. Continuous telemetry monitoring.      Major depressive disorder, recurrent  Chronic, controlled. Will continue home medications.       GERD (gastroesophageal reflux disease)  Chronic, continue home meds.      Essential hypertension  Chronic, controlled. Continue home medications. Will treat with PRN antihypertensives to keep SBP <180.     BP Readings from Last 3 Encounters:   10/09/19 119/70   12/28/17 124/63   03/02/14 (!) 139/94             VTE Risk Mitigation (From admission, onward)         Ordered     IP VTE LOW RISK PATIENT  Once      10/09/19 2130     Place sequential compression device  Until discontinued      10/09/19 2130     Place ABRAM hose  Until discontinued      10/09/19 2130                      Princess Segal NP  Department of Hospital Medicine   [facility]

## 2019-10-11 NOTE — ASSESSMENT & PLAN NOTE
Does not meet sepsis criteria at time of admission - Blood cultures pending, WBC 11, Lactic 1.0, afebrile. Orthopedic consult placed. IV Clindamycin. NPO at midnight. L elbow xray reviewed with no abscess noted  Patient likely with cellulitis and abscess. Continue IV antibiotic    Status post I&D on 10/10/2019.  Awaiting cultures

## 2019-10-12 VITALS
BODY MASS INDEX: 20.37 KG/M2 | RESPIRATION RATE: 19 BRPM | TEMPERATURE: 97 F | WEIGHT: 158.75 LBS | DIASTOLIC BLOOD PRESSURE: 76 MMHG | HEIGHT: 74 IN | OXYGEN SATURATION: 96 % | SYSTOLIC BLOOD PRESSURE: 128 MMHG | HEART RATE: 60 BPM

## 2019-10-12 LAB
ANION GAP SERPL CALC-SCNC: 8 MMOL/L (ref 8–16)
BASOPHILS # BLD AUTO: 0.06 K/UL (ref 0–0.2)
BASOPHILS NFR BLD: 0.9 % (ref 0–1.9)
BUN SERPL-MCNC: 10 MG/DL (ref 6–20)
CALCIUM SERPL-MCNC: 9.3 MG/DL (ref 8.7–10.5)
CHLORIDE SERPL-SCNC: 107 MMOL/L (ref 95–110)
CO2 SERPL-SCNC: 26 MMOL/L (ref 23–29)
CREAT SERPL-MCNC: 0.7 MG/DL (ref 0.5–1.4)
DIFFERENTIAL METHOD: ABNORMAL
EOSINOPHIL # BLD AUTO: 0.2 K/UL (ref 0–0.5)
EOSINOPHIL NFR BLD: 3.2 % (ref 0–8)
ERYTHROCYTE [DISTWIDTH] IN BLOOD BY AUTOMATED COUNT: 13.5 % (ref 11.5–14.5)
EST. GFR  (AFRICAN AMERICAN): >60 ML/MIN/1.73 M^2
EST. GFR  (NON AFRICAN AMERICAN): >60 ML/MIN/1.73 M^2
ESTIMATED AVG GLUCOSE: 105 MG/DL (ref 68–131)
GLUCOSE SERPL-MCNC: 118 MG/DL (ref 70–110)
HBA1C MFR BLD HPLC: 5.3 % (ref 4–5.6)
HCT VFR BLD AUTO: 43.2 % (ref 40–54)
HGB BLD-MCNC: 13.6 G/DL (ref 14–18)
IMM GRANULOCYTES # BLD AUTO: 0.02 K/UL (ref 0–0.04)
LYMPHOCYTES # BLD AUTO: 2.3 K/UL (ref 1–4.8)
LYMPHOCYTES NFR BLD: 33.9 % (ref 18–48)
MAGNESIUM SERPL-MCNC: 2 MG/DL (ref 1.6–2.6)
MCH RBC QN AUTO: 30.3 PG (ref 27–31)
MCHC RBC AUTO-ENTMCNC: 31.5 G/DL (ref 32–36)
MCV RBC AUTO: 96 FL (ref 82–98)
MONOCYTES # BLD AUTO: 0.7 K/UL (ref 0.3–1)
MONOCYTES NFR BLD: 10.6 % (ref 4–15)
NEUTROPHILS # BLD AUTO: 3.5 K/UL (ref 1.8–7.7)
NEUTROPHILS NFR BLD: 51.1 % (ref 38–73)
NRBC BLD-RTO: 0 /100 WBC
PLATELET # BLD AUTO: 255 K/UL (ref 150–350)
PMV BLD AUTO: 11.4 FL (ref 9.2–12.9)
POTASSIUM SERPL-SCNC: 4.5 MMOL/L (ref 3.5–5.1)
RBC # BLD AUTO: 4.49 M/UL (ref 4.6–6.2)
SODIUM SERPL-SCNC: 141 MMOL/L (ref 136–145)
WBC # BLD AUTO: 6.91 K/UL (ref 3.9–12.7)

## 2019-10-12 PROCEDURE — 80048 BASIC METABOLIC PNL TOTAL CA: CPT

## 2019-10-12 PROCEDURE — 96376 TX/PRO/DX INJ SAME DRUG ADON: CPT

## 2019-10-12 PROCEDURE — 90686 IIV4 VACC NO PRSV 0.5 ML IM: CPT | Performed by: HOSPITALIST

## 2019-10-12 PROCEDURE — 83735 ASSAY OF MAGNESIUM: CPT

## 2019-10-12 PROCEDURE — 25000003 PHARM REV CODE 250: Performed by: NURSE PRACTITIONER

## 2019-10-12 PROCEDURE — S4991 NICOTINE PATCH NONLEGEND: HCPCS | Performed by: NURSE PRACTITIONER

## 2019-10-12 PROCEDURE — S0077 INJECTION, CLINDAMYCIN PHOSP: HCPCS | Performed by: NURSE PRACTITIONER

## 2019-10-12 PROCEDURE — G0378 HOSPITAL OBSERVATION PER HR: HCPCS

## 2019-10-12 PROCEDURE — 25000003 PHARM REV CODE 250: Performed by: ORTHOPAEDIC SURGERY

## 2019-10-12 PROCEDURE — 83036 HEMOGLOBIN GLYCOSYLATED A1C: CPT

## 2019-10-12 PROCEDURE — 94761 N-INVAS EAR/PLS OXIMETRY MLT: CPT

## 2019-10-12 PROCEDURE — 85025 COMPLETE CBC W/AUTO DIFF WBC: CPT

## 2019-10-12 PROCEDURE — 63600175 PHARM REV CODE 636 W HCPCS: Performed by: HOSPITALIST

## 2019-10-12 PROCEDURE — 36415 COLL VENOUS BLD VENIPUNCTURE: CPT

## 2019-10-12 PROCEDURE — 90471 IMMUNIZATION ADMIN: CPT | Performed by: HOSPITALIST

## 2019-10-12 RX ORDER — OMEPRAZOLE 20 MG/1
20 CAPSULE, DELAYED RELEASE ORAL DAILY
Qty: 30 CAPSULE | Refills: 0 | Status: SHIPPED | OUTPATIENT
Start: 2019-10-12 | End: 2019-11-11

## 2019-10-12 RX ORDER — GABAPENTIN 800 MG/1
800 TABLET ORAL DAILY
Qty: 30 TABLET | Refills: 0 | Status: SHIPPED | OUTPATIENT
Start: 2019-10-12 | End: 2019-11-11

## 2019-10-12 RX ORDER — AMLODIPINE BESYLATE 2.5 MG/1
2.5 TABLET ORAL DAILY
Qty: 30 TABLET | Refills: 0 | Status: SHIPPED | OUTPATIENT
Start: 2019-10-12 | End: 2019-11-11

## 2019-10-12 RX ORDER — LISINOPRIL 40 MG/1
40 TABLET ORAL DAILY
Qty: 30 TABLET | Refills: 0 | Status: SHIPPED | OUTPATIENT
Start: 2019-10-12 | End: 2019-11-11

## 2019-10-12 RX ORDER — SULFAMETHOXAZOLE AND TRIMETHOPRIM 800; 160 MG/1; MG/1
1 TABLET ORAL 2 TIMES DAILY
Qty: 14 TABLET | Refills: 0 | Status: SHIPPED | OUTPATIENT
Start: 2019-10-12 | End: 2019-10-19

## 2019-10-12 RX ORDER — CITALOPRAM 40 MG/1
40 TABLET, FILM COATED ORAL DAILY
Qty: 30 TABLET | Refills: 0 | Status: SHIPPED | OUTPATIENT
Start: 2019-10-12 | End: 2019-11-11

## 2019-10-12 RX ADMIN — GABAPENTIN 800 MG: 400 CAPSULE ORAL at 08:10

## 2019-10-12 RX ADMIN — LISINOPRIL 40 MG: 40 TABLET ORAL at 08:10

## 2019-10-12 RX ADMIN — PANTOPRAZOLE SODIUM 40 MG: 40 TABLET, DELAYED RELEASE ORAL at 08:10

## 2019-10-12 RX ADMIN — NICOTINE 1 PATCH: 21 PATCH TRANSDERMAL at 08:10

## 2019-10-12 RX ADMIN — HYDROCODONE BITARTRATE AND ACETAMINOPHEN 1 TABLET: 5; 325 TABLET ORAL at 04:10

## 2019-10-12 RX ADMIN — INFLUENZA VIRUS VACCINE 0.5 ML: 15; 15; 15; 15 SUSPENSION INTRAMUSCULAR at 01:10

## 2019-10-12 RX ADMIN — AMLODIPINE BESYLATE 2.5 MG: 2.5 TABLET ORAL at 08:10

## 2019-10-12 RX ADMIN — CITALOPRAM HYDROBROMIDE 40 MG: 40 TABLET ORAL at 08:10

## 2019-10-12 RX ADMIN — CLINDAMYCIN IN 5 PERCENT DEXTROSE 900 MG: 18 INJECTION, SOLUTION INTRAVENOUS at 04:10

## 2019-10-12 RX ADMIN — IBUPROFEN 800 MG: 400 TABLET ORAL at 08:10

## 2019-10-12 NOTE — NURSING
Orders reviewed with pt. prescription sent to pharmacy. Educated on s/s of infection. Patient encouraged to get a blood pressure cuff to keep track of blood pressure.  Pt reminded not to take off dressing. Pt escorted to ER exit.

## 2019-10-12 NOTE — NURSING
Dressing changed to elbow. xerofoam put on incision. Gauze applied. Wrapped with kerlix and ACE. Pt tolerated well.

## 2019-10-12 NOTE — NURSING
Pt stated he is walking to cousins , offered pt to stay till he has a ride and he refused. Stated he wanted to go visit his cousin.

## 2019-10-12 NOTE — PLAN OF CARE
10/12/19 1220   Final Note   Assessment Type Final Discharge Note   Anticipated Discharge Disposition Home

## 2019-10-13 NOTE — HOSPITAL COURSE
Patient monitor closely during observation stay.  He was initiated on antibiotics for left elbow cellulitis and possible bursitis.  Orthopedic surgery consulted.  Patient underwent incision and drainage of left elbow abscess on 10/10/2019.  Cultures are positive for Staph aureus.  He was placed on oral antibiotics and is stable for discharge from orthopedic standpoint.  Surgical dressing change prior to discharge. Patient instructed to leave dressing intact until follow up outpatient with Orthopedic surgery.    Physical exam  Left elbow with decreased swelling and erythema.  Surgical site looks good.

## 2019-10-13 NOTE — DISCHARGE SUMMARY
[facility]  Hospital Medicine  Discharge Summary      Patient Name: Mook Anaya  MRN: [id number]  Admission Date: 10/9/2019  Hospital Length of Stay: 0 days  Discharge Date and Time: 10/12/2019  1:53 PM  Attending Physician: No att. providers found   Discharging Provider: Princess Segal NP  Primary Care Provider: [facility]      HPI:   Mook Anaya is a 54-year-old male with a past medical history of depression, hypertension, arthritis, hepatitis-C, and GERD who presented to the emergency room tonight with reports of left elbow pain and swelling. Patient reports abscess to left elbow that appeared initially 2 weeks ago, and has increased in size and tenderness. Patient also reports abscess to the axilla region and thigh.  Patient reports pain to the elbow is 10 on a 0-10 pain scale, has not tried over-the-counter medications for pain relief, denies drainage, describes pain as stabbing, pressure, and burning.  Patient denies fever, but does endorse nausea and diarrhea for the past 3 days, no vomiting.  Patient reports history of staph infection, last treated at [facility] approximately 2 years ago.  Patient reports significant tobacco dependency and appreciates the use of crystal meth, last consumed yesterday, patient states he smokes crystal meth approximately 1-2 times per week.  Patient denies intravenous drug use.  Patient appreciates weight loss, stating he does not have electricity at his home or access to groceries.  The patient placed in the hospital overnight for further workup and management of symptoms.  Patient not accompanied by any visitors during the initial interview.      Procedure(s) (LRB):  INCISION AND DRAINAGE, ABSCESS (Left)      Hospital Course:   Patient monitor closely during observation stay.  He was initiated on antibiotics for left elbow cellulitis and possible bursitis.  Orthopedic surgery consulted.  Patient underwent incision  and drainage of left elbow abscess on 10/10/2019.  Cultures are positive for Staph aureus.  He was placed on oral antibiotics and is stable for discharge from orthopedic standpoint.  Surgical dressing change prior to discharge. Patient instructed to leave dressing intact until follow up outpatient with Orthopedic surgery.    Physical exam  Left elbow with decreased swelling and erythema.  Surgical site looks good.     Consults:   Consults (From admission, onward)        Status Ordering Provider     Inpatient consult to Orthopedic Surgery  Once     Provider:  Meliton Eddy MD    Completed GERDA ARRINGTON          No new Assessment & Plan notes have been filed under this hospital service since the last note was generated.  Service: Hospital Medicine    Final Active Diagnoses:    Diagnosis Date Noted POA    PRINCIPAL PROBLEM:  Olecranon bursitis of left elbow [M70.22] 10/09/2019 Yes    Essential hypertension [I10] 10/09/2019 Yes    GERD (gastroesophageal reflux disease) [K21.9] 10/09/2019 Yes    Major depressive disorder, recurrent [F33.9] 10/09/2019 Yes    Methamphetamine use [F15.10] 10/09/2019 Yes    Tobacco dependence [F17.200] 10/09/2019 Yes      Problems Resolved During this Admission:       Discharged Condition: stable    Disposition: Home or Self Care    Follow Up:  Follow-up Information     Louis Donahue MD In 1 week.    Specialties:  Sports Medicine, Orthopedic Surgery  Contact information:  [address] [place] [address]  [address]  [facility] [id number] [phone]             [facility] On 10/17/2019.    Why:  10:30 AM for hospital follow up  Contact information:  [address]  [place] [id number] [phone]                 Patient Instructions:      Diet Cardiac     Notify your health care provider if you experience any of the following:  worsening rash     Notify your health care provider if you experience any of the following:  severe persistent headache     Notify your  health care provider if you experience any of the following:  difficulty breathing or increased cough     Notify your health care provider if you experience any of the following:  redness, tenderness, or signs of infection (pain, swelling, redness, odor or green/yellow discharge around incision site)     Notify your health care provider if you experience any of the following:  severe uncontrolled pain     Notify your health care provider if you experience any of the following:  persistent nausea and vomiting or diarrhea     Notify your health care provider if you experience any of the following:  temperature >100.4     Activity as tolerated       Significant Diagnostic Studies: Labs:   BMP:   Recent Labs   Lab 10/12/19  0414   *      K 4.5      CO2 26   BUN 10   CREATININE 0.7   CALCIUM 9.3   MG 2.0    and CMP   Recent Labs   Lab 10/12/19  0414      K 4.5      CO2 26   *   BUN 10   CREATININE 0.7   CALCIUM 9.3   ANIONGAP 8   ESTGFRAFRICA >60   EGFRNONAA >60     X-Ray Elbow 2 Views Left [[id number]] Resulted: 10/10/19 0851   Order Status: Completed Updated: 10/10/19 0853   Narrative:     EXAMINATION:  XR ELBOW 2 VIEWS LEFT    CLINICAL HISTORY:  Abscess;    TECHNIQUE:  Limited two views AP and lateral the left elbow.    COMPARISON:  None    FINDINGS:  There is soft tissue swelling posteriorly at the elbow.  No radiopaque foreign body is seen in the soft tissues.  No acute fracture or dislocation evident on the limited   Impression:       Soft tissue swelling.         Pending Diagnostic Studies:     None         Medications:  Reconciled Home Medications:      Medication List      START taking these medications    sulfamethoxazole-trimethoprim 800-160mg 800-160 mg Tab  Commonly known as:  BACTRIM DS  Take 1 tablet by mouth 2 (two) times daily. for 7 days        CONTINUE taking these medications    amLODIPine 2.5 MG tablet  Commonly known as:  NORVASC  Take 1 tablet (2.5 mg total) by  mouth once daily.     citalopram 40 MG tablet  Commonly known as:  CELEXA  Take 1 tablet (40 mg total) by mouth once daily.     gabapentin 800 MG tablet  Commonly known as:  NEURONTIN  Take 1 tablet (800 mg total) by mouth once daily.     lisinopril 40 MG tablet  Commonly known as:  PRINIVIL,ZESTRIL  Take 1 tablet (40 mg total) by mouth once daily.     omeprazole 20 MG capsule  Commonly known as:  PRILOSEC  Take 1 capsule (20 mg total) by mouth once daily.            Indwelling Lines/Drains at time of discharge:   Lines/Drains/Airways     None                 Time spent on the discharge of patient: 60 minutes  Patient was seen and examined on the date of discharge and determined to be suitable for discharge.         Princess Segal NP  Department of Hospital Medicine  [facility]

## 2019-10-14 ENCOUNTER — TELEPHONE (OUTPATIENT)
Dept: MEDSURG UNIT | Facility: HOSPITAL | Age: 54
End: 2019-10-14

## 2019-10-14 LAB — BACTERIA SPEC AEROBE CULT: ABNORMAL

## 2019-10-15 LAB
BACTERIA BLD CULT: NORMAL
BACTERIA BLD CULT: NORMAL
BACTERIA SPEC ANAEROBE CULT: NORMAL

## 2019-11-12 LAB — FUNGUS SPEC CULT: NORMAL

## 2019-11-14 RX ORDER — AMLODIPINE BESYLATE 2.5 MG/1
TABLET ORAL
Qty: 30 TABLET | Refills: 0 | OUTPATIENT
Start: 2019-11-14

## 2019-11-14 RX ORDER — LISINOPRIL 40 MG/1
TABLET ORAL
Qty: 30 TABLET | Refills: 0 | OUTPATIENT
Start: 2019-11-14

## 2019-11-25 LAB
ACID FAST MOD KINY STN SPEC: NORMAL
MYCOBACTERIUM SPEC QL CULT: NORMAL

## (undated) DEVICE — TOURNIQUET SB QC DP 18X4IN

## (undated) DEVICE — SCRUB 10% POVIDONE IODINE 4OZ

## (undated) DEVICE — LINER SUCTION 3000CC

## (undated) DEVICE — DRESSING XEROFORM FOIL PK 1X8

## (undated) DEVICE — SOL 9P NACL IRR PIC IL

## (undated) DEVICE — UNDERGLOVES BIOGEL PI SIZE 8.5

## (undated) DEVICE — SEE MEDLINE ITEM 157131

## (undated) DEVICE — STRAP OR TABLE 5IN X 72IN

## (undated) DEVICE — PACK CUSTOM UNIV BASIN SLI

## (undated) DEVICE — SEE MEDLINE ITEM 146292

## (undated) DEVICE — SWAB CULTURETTE SINGLE

## (undated) DEVICE — SLING ORTHOPEDIC LARGE

## (undated) DEVICE — PACK ARTHROSCOPY W/ISO BAC

## (undated) DEVICE — BANDAGE CONFORM 3IN STRL

## (undated) DEVICE — GLOVE SURG ULTRA TOUCH 8.5

## (undated) DEVICE — ELECTRODE REM PLYHSV RETURN 9

## (undated) DEVICE — SOL PVP-I SCRUB 7.5% 4OZ

## (undated) DEVICE — SPONGE SUPER KERLIX 6X6.75IN

## (undated) DEVICE — SEE MEDLINE ITEM 157166

## (undated) DEVICE — SEE MEDLINE ITEM 157118

## (undated) DEVICE — TRAY DRY SPONGE SCRUB W FOAM

## (undated) DEVICE — SLEEVE SCD EXPRESS CALF MEDIUM

## (undated) DEVICE — SEE MEDLINE ITEM 157117

## (undated) DEVICE — SUT ETHILON 2-0 BLK PS-2

## (undated) DEVICE — SUT ETHILON 4-0 PS2 18 BLK

## (undated) DEVICE — COLLECTOR SPECIMEN ANAEROBIC

## (undated) DEVICE — BANDAGE ELASTIC 3X5 VELCRO ST

## (undated) DEVICE — INTERPULSE SET